# Patient Record
Sex: FEMALE | Race: BLACK OR AFRICAN AMERICAN | NOT HISPANIC OR LATINO | Employment: FULL TIME | URBAN - METROPOLITAN AREA
[De-identification: names, ages, dates, MRNs, and addresses within clinical notes are randomized per-mention and may not be internally consistent; named-entity substitution may affect disease eponyms.]

---

## 2022-06-29 ENCOUNTER — HOSPITAL ENCOUNTER (OUTPATIENT)
Dept: RADIOLOGY | Facility: CLINIC | Age: 67
Discharge: HOME | End: 2022-06-29
Attending: SPECIALIST
Payer: MEDICARE

## 2022-06-29 ENCOUNTER — OFFICE VISIT (OUTPATIENT)
Dept: ORTHOPEDICS | Facility: CLINIC | Age: 67
End: 2022-06-29
Payer: MEDICARE

## 2022-06-29 VITALS
HEIGHT: 61 IN | WEIGHT: 118.38 LBS | BODY MASS INDEX: 22.35 KG/M2 | HEART RATE: 108 BPM | DIASTOLIC BLOOD PRESSURE: 76 MMHG | SYSTOLIC BLOOD PRESSURE: 120 MMHG | TEMPERATURE: 98 F

## 2022-06-29 DIAGNOSIS — M25.561 RIGHT KNEE PAIN, UNSPECIFIED CHRONICITY: Primary | ICD-10-CM

## 2022-06-29 DIAGNOSIS — R26.89 IMPAIRED GAIT AND MOBILITY: ICD-10-CM

## 2022-06-29 DIAGNOSIS — M06.9 RHEUMATOID ARTHRITIS, INVOLVING UNSPECIFIED SITE, UNSPECIFIED WHETHER RHEUMATOID FACTOR PRESENT: ICD-10-CM

## 2022-06-29 DIAGNOSIS — M17.11 PRIMARY OSTEOARTHRITIS OF RIGHT KNEE: ICD-10-CM

## 2022-06-29 DIAGNOSIS — M25.561 RIGHT KNEE PAIN, UNSPECIFIED CHRONICITY: ICD-10-CM

## 2022-06-29 PROCEDURE — 3078F PR MOST RECENT DIASTOLIC BLOOD PRESSURE < 80 MM HG: ICD-10-PCS | Mod: CPTII,,, | Performed by: SPECIALIST

## 2022-06-29 PROCEDURE — 1159F PR MEDICATION LIST DOCUMENTED IN MEDICAL RECORD: ICD-10-PCS | Mod: CPTII,,, | Performed by: SPECIALIST

## 2022-06-29 PROCEDURE — 1160F PR REVIEW ALL MEDS BY PRESCRIBER/CLIN PHARMACIST DOCUMENTED: ICD-10-PCS | Mod: CPTII,,, | Performed by: SPECIALIST

## 2022-06-29 PROCEDURE — 4010F PR ACE/ARB THEARPY RXD/TAKEN: ICD-10-PCS | Mod: CPTII,,, | Performed by: SPECIALIST

## 2022-06-29 PROCEDURE — 73564 XR KNEE COMP 4 OR MORE VIEWS RIGHT: ICD-10-PCS | Mod: RT,,, | Performed by: SPECIALIST

## 2022-06-29 PROCEDURE — 1125F PR PAIN SEVERITY QUANTIFIED, PAIN PRESENT: ICD-10-PCS | Mod: CPTII,,, | Performed by: SPECIALIST

## 2022-06-29 PROCEDURE — 99202 PR OFFICE/OUTPT VISIT, NEW, LEVL II, 15-29 MIN: ICD-10-PCS | Mod: ,,, | Performed by: SPECIALIST

## 2022-06-29 PROCEDURE — 73564 X-RAY EXAM KNEE 4 OR MORE: CPT | Mod: RT,,, | Performed by: SPECIALIST

## 2022-06-29 PROCEDURE — 3008F BODY MASS INDEX DOCD: CPT | Mod: CPTII,,, | Performed by: SPECIALIST

## 2022-06-29 PROCEDURE — 1159F MED LIST DOCD IN RCRD: CPT | Mod: CPTII,,, | Performed by: SPECIALIST

## 2022-06-29 PROCEDURE — 99202 OFFICE O/P NEW SF 15 MIN: CPT | Mod: ,,, | Performed by: SPECIALIST

## 2022-06-29 PROCEDURE — 3078F DIAST BP <80 MM HG: CPT | Mod: CPTII,,, | Performed by: SPECIALIST

## 2022-06-29 PROCEDURE — 1101F PT FALLS ASSESS-DOCD LE1/YR: CPT | Mod: CPTII,,, | Performed by: SPECIALIST

## 2022-06-29 PROCEDURE — 3008F PR BODY MASS INDEX (BMI) DOCUMENTED: ICD-10-PCS | Mod: CPTII,,, | Performed by: SPECIALIST

## 2022-06-29 PROCEDURE — 3074F SYST BP LT 130 MM HG: CPT | Mod: CPTII,,, | Performed by: SPECIALIST

## 2022-06-29 PROCEDURE — 1160F RVW MEDS BY RX/DR IN RCRD: CPT | Mod: CPTII,,, | Performed by: SPECIALIST

## 2022-06-29 PROCEDURE — 1101F PR PT FALLS ASSESS DOC 0-1 FALLS W/OUT INJ PAST YR: ICD-10-PCS | Mod: CPTII,,, | Performed by: SPECIALIST

## 2022-06-29 PROCEDURE — 3288F FALL RISK ASSESSMENT DOCD: CPT | Mod: CPTII,,, | Performed by: SPECIALIST

## 2022-06-29 PROCEDURE — 1125F AMNT PAIN NOTED PAIN PRSNT: CPT | Mod: CPTII,,, | Performed by: SPECIALIST

## 2022-06-29 PROCEDURE — 3288F PR FALLS RISK ASSESSMENT DOCUMENTED: ICD-10-PCS | Mod: CPTII,,, | Performed by: SPECIALIST

## 2022-06-29 PROCEDURE — 3074F PR MOST RECENT SYSTOLIC BLOOD PRESSURE < 130 MM HG: ICD-10-PCS | Mod: CPTII,,, | Performed by: SPECIALIST

## 2022-06-29 PROCEDURE — 4010F ACE/ARB THERAPY RXD/TAKEN: CPT | Mod: CPTII,,, | Performed by: SPECIALIST

## 2022-06-29 RX ORDER — CETIRIZINE HYDROCHLORIDE 10 MG/1
TABLET ORAL
COMMUNITY

## 2022-06-29 RX ORDER — ROSUVASTATIN CALCIUM 5 MG/1
5 TABLET, COATED ORAL DAILY
COMMUNITY
Start: 2022-05-27

## 2022-06-29 RX ORDER — BENAZEPRIL HYDROCHLORIDE 40 MG/1
40 TABLET ORAL DAILY
COMMUNITY
Start: 2022-05-27

## 2022-06-29 RX ORDER — AMLODIPINE BESYLATE 10 MG/1
10 TABLET ORAL DAILY
COMMUNITY
Start: 2022-05-27

## 2022-06-29 RX ORDER — METHOTREXATE 2.5 MG/1
15 TABLET ORAL
Status: ON HOLD | COMMUNITY
Start: 2022-05-13 | End: 2022-08-11 | Stop reason: SDUPTHER

## 2022-06-29 RX ORDER — METHOTREXATE 25 MG/ML
INJECTION, SOLUTION INTRA-ARTERIAL; INTRAMUSCULAR; INTRAVENOUS
Status: ON HOLD | COMMUNITY
Start: 2022-01-28 | End: 2022-08-11 | Stop reason: HOSPADM

## 2022-06-29 RX ORDER — FOLIC ACID 1 MG/1
1000 TABLET ORAL DAILY
COMMUNITY
Start: 2022-05-13

## 2022-06-29 NOTE — PROGRESS NOTES
History of present illness:    66 year old female presents today for evaluation of her right knee pain.  She has a known history of rheumatoid arthritis as well as right knee OA.  She is being followed by rheumatology.  She has ongoing knee pain and swelling and failed conservative treatment such as injections.  She is here to discuss surgical intervention    Past Medical History:   Diagnosis Date    High cholesterol     Hypertension     RA (rheumatoid arthritis)        Past Surgical History:   Procedure Laterality Date    right ovary removal         Current Outpatient Medications   Medication Sig    amLODIPine (NORVASC) 10 MG tablet Take 10 mg by mouth once daily.    benazepriL (LOTENSIN) 40 MG tablet Take 40 mg by mouth once daily.    cetirizine (ZYRTEC) 10 MG tablet Zyrtec 10 mg tablet   Take 1 tablet every day by oral route.    folic acid (FOLVITE) 1 MG tablet Take 1,000 mcg by mouth once daily.    methotrexate 2.5 MG Tab Take 15 mg by mouth every 7 days.    methotrexate 25 mg/mL injection Inject 15 mg into the skin every 7 days.    rosuvastatin (CRESTOR) 5 MG tablet Take 5 mg by mouth once daily.     No current facility-administered medications for this visit.       Review of patient's allergies indicates:   Allergen Reactions    Codeine     Lortab [hydrocodone-acetaminophen]     Opioids - morphine analogues        Family History   Family history unknown: Yes       Social History     Socioeconomic History    Marital status:    Tobacco Use    Smoking status: Never Smoker    Smokeless tobacco: Never Used   Substance and Sexual Activity    Alcohol use: Never    Drug use: Never         Review of Systems:    Constitution:   Denies chills, fever, and sweats.  HENT:   Denies headaches or blurry vision.  Cardiovascular:  Denies chest pain or irregular heart beat.  Respiratory:   Denies cough or shortness of breath.  Gastrointestinal:  Denies abdominal pain, nausea, or  "vomiting.  Musculoskeletal:   Denies muscle cramps.  Neurological:   Denies dizziness or focal weakness.  Psychiatric/Behavior: Normal mental status.  Hematology/Lymph:  Denies bleeding problem or easy bruising/bleeding.  Skin:    Denies rash or suspicious lesions.    Examination:    Vital Signs:    Vitals:    06/29/22 1050 06/29/22 1051   BP: 120/76    Pulse: 108    Temp: 97.6 °F (36.4 °C)    Weight: 53.7 kg (118 lb 6.4 oz)    Height: 5' 1" (1.549 m)    PainSc:   8   8       Body mass index is 22.37 kg/m².    Constitution:   Well-developed, well nourished patient in no acute distress.  Neurological:   Alert and oriented x 3 and cooperative to examination.     Psychiatric/Behavior: Normal mental status.  Respiratory:   No shortness of breath.  Eyes:    Extraoccular muscles intact  Skin:    No scars, rash or suspicious lesions.    Physical Exam:     right Knee:     Grade effusion 1    No erythema or increased heat varus deformity    Patella demonstrated crepitus.    Anteromedial aspect was tender on palpation. Medial aspect was tender on palpation. Medial collateral ligament was tender on palpation.    No lateral joint line tenderness   Active flexion 120 degrees   Active extension 15 degrees   antalgic gait was observed.     X-Ray Knee: A complete knee x-ray with standing for 4 views was performed of the right knee     IMPRESSIONS RADIOLOGY TEST   Narrowing of the joint space bone on bone in medial, lateral and patellofemoral compartments, and osteophytes arising from the knee.    Kellgren Nestor grade 4 changes        Assessment:     Right knee pain, unspecified chronicity  -     X-Ray Knee Complete 4 Or More Views Right; Future; Expected date: 06/29/2022    Primary osteoarthritis of right knee    Rheumatoid arthritis, involving unspecified site, unspecified whether rheumatoid factor present    Impaired gait and mobility        Plan:      Robotic assisted right total knee arthroplasty  The patient is on " methotrexate and Humira so we will hold these medicines prior to surgery  The proposed procedure as well as associated risks/benefits were discussed at length with the patient and family with risks to include pain, bleeding, infection, future surgeries, neurovascular compromise, loss of limb, heart attack, stroke, deep vein thrombosis, and even death. They understand and agree with the treatment plan.      DISCLAIMER: This note may have been dictated using voice recognition software and may contain grammatical errors.

## 2022-07-22 ENCOUNTER — HOSPITAL ENCOUNTER (OUTPATIENT)
Dept: RADIOLOGY | Facility: HOSPITAL | Age: 67
Discharge: HOME | End: 2022-07-22
Attending: PHYSICIAN ASSISTANT
Payer: MEDICARE

## 2022-07-22 ENCOUNTER — OFFICE VISIT (OUTPATIENT)
Dept: ORTHOPEDICS | Facility: CLINIC | Age: 67
End: 2022-07-22
Payer: MEDICARE

## 2022-07-22 VITALS
BODY MASS INDEX: 22.28 KG/M2 | DIASTOLIC BLOOD PRESSURE: 84 MMHG | HEART RATE: 78 BPM | WEIGHT: 118 LBS | HEIGHT: 61 IN | SYSTOLIC BLOOD PRESSURE: 140 MMHG

## 2022-07-22 DIAGNOSIS — M17.11 PRIMARY OSTEOARTHRITIS OF RIGHT KNEE: Primary | ICD-10-CM

## 2022-07-22 DIAGNOSIS — Z01.812 PRE-OPERATIVE LABORATORY EXAMINATION: ICD-10-CM

## 2022-07-22 DIAGNOSIS — M17.11 PRIMARY OSTEOARTHRITIS OF RIGHT KNEE: ICD-10-CM

## 2022-07-22 DIAGNOSIS — E78.00 HYPERCHOLESTEROLEMIA: ICD-10-CM

## 2022-07-22 DIAGNOSIS — M06.9 RHEUMATOID ARTHRITIS, INVOLVING UNSPECIFIED SITE, UNSPECIFIED WHETHER RHEUMATOID FACTOR PRESENT: ICD-10-CM

## 2022-07-22 DIAGNOSIS — R26.89 IMPAIRED GAIT AND MOBILITY: ICD-10-CM

## 2022-07-22 DIAGNOSIS — I10 HYPERTENSION, UNSPECIFIED TYPE: ICD-10-CM

## 2022-07-22 LAB
APPEARANCE UR: CLEAR
BACTERIA #/AREA URNS AUTO: ABNORMAL /HPF
BILIRUB UR QL STRIP.AUTO: NEGATIVE MG/DL
COLOR UR AUTO: ABNORMAL
GLUCOSE UR QL STRIP.AUTO: NEGATIVE MG/DL
KETONES UR QL STRIP.AUTO: NEGATIVE MG/DL
LEUKOCYTE ESTERASE UR QL STRIP.AUTO: NEGATIVE UNIT/L
MRSA PCR SCRN (OHS): NOT DETECTED
NITRITE UR QL STRIP.AUTO: NEGATIVE
PH UR STRIP.AUTO: 6 [PH]
PROT UR QL STRIP.AUTO: NEGATIVE MG/DL
RBC #/AREA URNS AUTO: ABNORMAL /HPF
RBC UR QL AUTO: ABNORMAL UNIT/L
SP GR UR STRIP.AUTO: 1.01
SQUAMOUS #/AREA URNS AUTO: ABNORMAL /HPF
UROBILINOGEN UR STRIP-ACNC: 0.2 MG/DL
WBC #/AREA URNS AUTO: ABNORMAL /HPF

## 2022-07-22 PROCEDURE — 3008F BODY MASS INDEX DOCD: CPT | Mod: CPTII,,, | Performed by: PHYSICIAN ASSISTANT

## 2022-07-22 PROCEDURE — 4010F PR ACE/ARB THEARPY RXD/TAKEN: ICD-10-PCS | Mod: CPTII,,, | Performed by: PHYSICIAN ASSISTANT

## 2022-07-22 PROCEDURE — 3077F PR MOST RECENT SYSTOLIC BLOOD PRESSURE >= 140 MM HG: ICD-10-PCS | Mod: CPTII,,, | Performed by: PHYSICIAN ASSISTANT

## 2022-07-22 PROCEDURE — 99213 PR OFFICE/OUTPT VISIT, EST, LEVL III, 20-29 MIN: ICD-10-PCS | Mod: ,,, | Performed by: PHYSICIAN ASSISTANT

## 2022-07-22 PROCEDURE — 71046 X-RAY EXAM CHEST 2 VIEWS: CPT | Mod: TC

## 2022-07-22 PROCEDURE — 73700 CT LOWER EXTREMITY W/O DYE: CPT | Mod: TC,RT

## 2022-07-22 PROCEDURE — 3008F PR BODY MASS INDEX (BMI) DOCUMENTED: ICD-10-PCS | Mod: CPTII,,, | Performed by: PHYSICIAN ASSISTANT

## 2022-07-22 PROCEDURE — 3079F DIAST BP 80-89 MM HG: CPT | Mod: CPTII,,, | Performed by: PHYSICIAN ASSISTANT

## 2022-07-22 PROCEDURE — 4010F ACE/ARB THERAPY RXD/TAKEN: CPT | Mod: CPTII,,, | Performed by: PHYSICIAN ASSISTANT

## 2022-07-22 PROCEDURE — 3077F SYST BP >= 140 MM HG: CPT | Mod: CPTII,,, | Performed by: PHYSICIAN ASSISTANT

## 2022-07-22 PROCEDURE — 3079F PR MOST RECENT DIASTOLIC BLOOD PRESSURE 80-89 MM HG: ICD-10-PCS | Mod: CPTII,,, | Performed by: PHYSICIAN ASSISTANT

## 2022-07-22 PROCEDURE — 1160F PR REVIEW ALL MEDS BY PRESCRIBER/CLIN PHARMACIST DOCUMENTED: ICD-10-PCS | Mod: CPTII,,, | Performed by: PHYSICIAN ASSISTANT

## 2022-07-22 PROCEDURE — 1159F MED LIST DOCD IN RCRD: CPT | Mod: CPTII,,, | Performed by: PHYSICIAN ASSISTANT

## 2022-07-22 PROCEDURE — 1160F RVW MEDS BY RX/DR IN RCRD: CPT | Mod: CPTII,,, | Performed by: PHYSICIAN ASSISTANT

## 2022-07-22 PROCEDURE — 99213 OFFICE O/P EST LOW 20 MIN: CPT | Mod: ,,, | Performed by: PHYSICIAN ASSISTANT

## 2022-07-22 PROCEDURE — 1159F PR MEDICATION LIST DOCUMENTED IN MEDICAL RECORD: ICD-10-PCS | Mod: CPTII,,, | Performed by: PHYSICIAN ASSISTANT

## 2022-07-22 RX ORDER — SODIUM CHLORIDE 9 MG/ML
INJECTION, SOLUTION INTRAVENOUS CONTINUOUS
Status: CANCELLED | OUTPATIENT
Start: 2022-07-22

## 2022-07-22 RX ORDER — KETOROLAC TROMETHAMINE 30 MG/ML
15 INJECTION, SOLUTION INTRAMUSCULAR; INTRAVENOUS
Status: CANCELLED | OUTPATIENT
Start: 2022-07-22 | End: 2022-07-22

## 2022-07-22 RX ORDER — GABAPENTIN 100 MG/1
600 CAPSULE ORAL
Status: CANCELLED | OUTPATIENT
Start: 2022-07-22

## 2022-07-22 RX ORDER — SCOLOPAMINE TRANSDERMAL SYSTEM 1 MG/1
1 PATCH, EXTENDED RELEASE TRANSDERMAL
Status: CANCELLED | OUTPATIENT
Start: 2022-07-22

## 2022-07-22 RX ORDER — ACETAMINOPHEN 500 MG
1000 TABLET ORAL
Status: CANCELLED | OUTPATIENT
Start: 2022-07-22 | End: 2022-07-22

## 2022-07-22 RX ORDER — TRANEXAMIC ACID 650 MG/1
1950 TABLET ORAL
Status: CANCELLED | OUTPATIENT
Start: 2022-07-22 | End: 2022-07-22

## 2022-07-22 NOTE — H&P (VIEW-ONLY)
History of present illness:    66 year old female presents today for preop evaluation for robotic assisted right total knee arthroplasty on August 9th.  She has a known history of rheumatoid arthritis as well as right knee OA.  She is being followed by rheumatology.  She has ongoing knee pain and swelling and failed conservative treatment such as injections.  No relief with activity modification , home exercise program and medications.  She has decreased range of motion which has decreased activities of daily living.  She is ready to undergo the operation     Past Medical History:   Diagnosis Date    High cholesterol     Hypertension     RA (rheumatoid arthritis)        Past Surgical History:   Procedure Laterality Date    right ovary removal         Current Outpatient Medications   Medication Sig    amLODIPine (NORVASC) 10 MG tablet Take 10 mg by mouth once daily.    benazepriL (LOTENSIN) 40 MG tablet Take 40 mg by mouth once daily.    cetirizine (ZYRTEC) 10 MG tablet Zyrtec 10 mg tablet   Take 1 tablet every day by oral route.    folic acid (FOLVITE) 1 MG tablet Take 1,000 mcg by mouth once daily.    methotrexate 2.5 MG Tab Take 15 mg by mouth every 7 days.    methotrexate 25 mg/mL injection Inject 15 mg into the skin every 7 days.    rosuvastatin (CRESTOR) 5 MG tablet Take 5 mg by mouth once daily.     No current facility-administered medications for this visit.       Review of patient's allergies indicates:   Allergen Reactions    Codeine     Lortab [hydrocodone-acetaminophen]     Opioids - morphine analogues        Family History   Family history unknown: Yes       Social History     Socioeconomic History    Marital status:    Tobacco Use    Smoking status: Never Smoker    Smokeless tobacco: Never Used   Substance and Sexual Activity    Alcohol use: Never    Drug use: Never         Review of Systems:    Constitution:   Denies chills, fever, and sweats.  HENT:   Denies headaches or  "blurry vision.  Cardiovascular:  Denies chest pain or irregular heart beat.  Respiratory:   Denies cough or shortness of breath.  Gastrointestinal:  Denies abdominal pain, nausea, or vomiting.  Musculoskeletal:   Denies muscle cramps.  Neurological:   Denies dizziness or focal weakness.  Psychiatric/Behavior: Normal mental status.  Hematology/Lymph:  Denies bleeding problem or easy bruising/bleeding.  Skin:    Denies rash or suspicious lesions.    Examination:    Vital Signs:    Vitals:    07/22/22 1004   BP: (!) 140/84   Pulse: 78   Weight: 53.5 kg (118 lb)   Height: 5' 1" (1.549 m)       Body mass index is 22.3 kg/m².    Constitution:   Well-developed, well nourished patient in no acute distress.  Neurological:   Alert and oriented x 3 and cooperative to examination.     Psychiatric/Behavior: Normal mental status.  Respiratory:   No shortness of breath.  Eyes:    Extraoccular muscles intact  Skin:    No scars, rash or suspicious lesions.    Physical Exam:     right Knee:     Grade effusion 1    No erythema or increased heat varus deformity    Patella demonstrated crepitus.    Anteromedial aspect was tender on palpation. Medial aspect was tender on palpation. Medial collateral ligament was tender on palpation.    No lateral joint line tenderness   Active flexion 120 degrees   Active extension 15 degrees   antalgic gait was observed.     X-Ray Knee: A complete knee x-ray with standing for 4 views was reviewed of the right knee     IMPRESSIONS RADIOLOGY TEST   Narrowing of the joint space bone on bone in medial, lateral and patellofemoral compartments, and osteophytes arising from the knee.    Kellgren Nestor grade 4 changes        Assessment:     Primary osteoarthritis of right knee    Rheumatoid arthritis, involving unspecified site, unspecified whether rheumatoid factor present    Impaired gait and mobility    Pre-operative laboratory examination        Plan:      Robotic assisted right total knee " arthroplasty  We will use Ecotrin aspirin postoperatively for DVT prophylaxis  The patient is on methotrexate and Humira so we will hold these medicines prior to surgery  The proposed procedure as well as associated risks/benefits were discussed at length with the patient and family with risks to include pain, bleeding, infection, future surgeries, neurovascular compromise, loss of limb, heart attack, stroke, deep vein thrombosis, and even death. They understand and agree with the treatment plan.      DISCLAIMER: This note may have been dictated using voice recognition software and may contain grammatical errors.

## 2022-08-08 ENCOUNTER — TELEPHONE (OUTPATIENT)
Dept: PREADMISSION TESTING | Facility: HOSPITAL | Age: 67
End: 2022-08-08
Payer: MEDICARE

## 2022-08-08 ENCOUNTER — LAB VISIT (OUTPATIENT)
Dept: LAB | Facility: HOSPITAL | Age: 67
End: 2022-08-08
Attending: SPECIALIST
Payer: MEDICARE

## 2022-08-08 ENCOUNTER — ANESTHESIA EVENT (OUTPATIENT)
Dept: SURGERY | Facility: HOSPITAL | Age: 67
End: 2022-08-08
Payer: MEDICARE

## 2022-08-08 DIAGNOSIS — Z01.818 PREOP TESTING: Primary | ICD-10-CM

## 2022-08-08 LAB — SARS-COV-2 RDRP RESP QL NAA+PROBE: NEGATIVE

## 2022-08-08 PROCEDURE — 87635 SARS-COV-2 COVID-19 AMP PRB: CPT

## 2022-08-08 RX ORDER — IBUPROFEN 100 MG/5ML
1000 SUSPENSION, ORAL (FINAL DOSE FORM) ORAL DAILY
COMMUNITY

## 2022-08-09 ENCOUNTER — ANESTHESIA (OUTPATIENT)
Dept: SURGERY | Facility: HOSPITAL | Age: 67
End: 2022-08-09
Payer: MEDICARE

## 2022-08-09 ENCOUNTER — HOSPITAL ENCOUNTER (OUTPATIENT)
Facility: HOSPITAL | Age: 67
Discharge: HOME | End: 2022-08-11
Attending: SPECIALIST | Admitting: SPECIALIST
Payer: MEDICARE

## 2022-08-09 DIAGNOSIS — I10 HYPERTENSION, UNSPECIFIED TYPE: ICD-10-CM

## 2022-08-09 DIAGNOSIS — E78.00 HYPERCHOLESTEROLEMIA: ICD-10-CM

## 2022-08-09 DIAGNOSIS — Z01.812 PRE-OPERATIVE LABORATORY EXAMINATION: ICD-10-CM

## 2022-08-09 DIAGNOSIS — M06.9 RHEUMATOID ARTHRITIS, INVOLVING UNSPECIFIED SITE, UNSPECIFIED WHETHER RHEUMATOID FACTOR PRESENT: ICD-10-CM

## 2022-08-09 DIAGNOSIS — M17.11 PRIMARY OSTEOARTHRITIS OF RIGHT KNEE: ICD-10-CM

## 2022-08-09 DIAGNOSIS — R26.89 IMPAIRED GAIT AND MOBILITY: ICD-10-CM

## 2022-08-09 LAB
HCT VFR BLD AUTO: 32.4 % (ref 37–47)
HGB BLD-MCNC: 10.3 GM/DL (ref 12–16)
POCT GLUCOSE: 100 MG/DL (ref 70–110)

## 2022-08-09 PROCEDURE — 36000713 HC OR TIME LEV V EA ADD 15 MIN: Performed by: SPECIALIST

## 2022-08-09 PROCEDURE — C1713 ANCHOR/SCREW BN/BN,TIS/BN: HCPCS | Performed by: SPECIALIST

## 2022-08-09 PROCEDURE — 96365 THER/PROPH/DIAG IV INF INIT: CPT | Mod: 59

## 2022-08-09 PROCEDURE — A4216 STERILE WATER/SALINE, 10 ML: HCPCS | Performed by: SPECIALIST

## 2022-08-09 PROCEDURE — 37000008 HC ANESTHESIA 1ST 15 MINUTES: Performed by: SPECIALIST

## 2022-08-09 PROCEDURE — 25000003 PHARM REV CODE 250: Performed by: NURSE PRACTITIONER

## 2022-08-09 PROCEDURE — 11000001 HC ACUTE MED/SURG PRIVATE ROOM

## 2022-08-09 PROCEDURE — C1776 JOINT DEVICE (IMPLANTABLE): HCPCS | Performed by: SPECIALIST

## 2022-08-09 PROCEDURE — 30000890 SPECIMEN TO PATHOLOGY: Performed by: SPECIALIST

## 2022-08-09 PROCEDURE — 25000003 PHARM REV CODE 250: Performed by: NURSE ANESTHETIST, CERTIFIED REGISTERED

## 2022-08-09 PROCEDURE — 88311 DECALCIFY TISSUE: CPT | Performed by: SPECIALIST

## 2022-08-09 PROCEDURE — 27201423 OPTIME MED/SURG SUP & DEVICES STERILE SUPPLY: Performed by: SPECIALIST

## 2022-08-09 PROCEDURE — 82962 GLUCOSE BLOOD TEST: CPT | Performed by: SPECIALIST

## 2022-08-09 PROCEDURE — 20985 PR CPTR-ASST SURGICAL NAVIGATION IMAGE-LESS: ICD-10-PCS | Mod: ,,, | Performed by: SPECIALIST

## 2022-08-09 PROCEDURE — 63600175 PHARM REV CODE 636 W HCPCS

## 2022-08-09 PROCEDURE — 36000712 HC OR TIME LEV V 1ST 15 MIN: Performed by: SPECIALIST

## 2022-08-09 PROCEDURE — 30000890 HC MISC. SEND OUT TEST: Performed by: SPECIALIST

## 2022-08-09 PROCEDURE — 71000033 HC RECOVERY, INTIAL HOUR: Performed by: SPECIALIST

## 2022-08-09 PROCEDURE — 99900035 HC TECH TIME PER 15 MIN (STAT)

## 2022-08-09 PROCEDURE — 96376 TX/PRO/DX INJ SAME DRUG ADON: CPT | Mod: 59

## 2022-08-09 PROCEDURE — 94799 UNLISTED PULMONARY SVC/PX: CPT

## 2022-08-09 PROCEDURE — G0378 HOSPITAL OBSERVATION PER HR: HCPCS

## 2022-08-09 PROCEDURE — 63600175 PHARM REV CODE 636 W HCPCS: Performed by: NURSE ANESTHETIST, CERTIFIED REGISTERED

## 2022-08-09 PROCEDURE — 96375 TX/PRO/DX INJ NEW DRUG ADDON: CPT

## 2022-08-09 PROCEDURE — 63600175 PHARM REV CODE 636 W HCPCS: Performed by: SPECIALIST

## 2022-08-09 PROCEDURE — 25000003 PHARM REV CODE 250: Performed by: SPECIALIST

## 2022-08-09 PROCEDURE — 85014 HEMATOCRIT: CPT | Performed by: SPECIALIST

## 2022-08-09 PROCEDURE — 25000003 PHARM REV CODE 250: Performed by: PHYSICIAN ASSISTANT

## 2022-08-09 PROCEDURE — 63600175 PHARM REV CODE 636 W HCPCS: Performed by: PHYSICIAN ASSISTANT

## 2022-08-09 PROCEDURE — 20985 CPTR-ASST DIR MS PX: CPT | Mod: ,,, | Performed by: SPECIALIST

## 2022-08-09 PROCEDURE — 27447 PR TOTAL KNEE ARTHROPLASTY: ICD-10-PCS | Mod: RT,,, | Performed by: SPECIALIST

## 2022-08-09 PROCEDURE — 36415 COLL VENOUS BLD VENIPUNCTURE: CPT | Performed by: SPECIALIST

## 2022-08-09 PROCEDURE — 97162 PT EVAL MOD COMPLEX 30 MIN: CPT

## 2022-08-09 PROCEDURE — 27447 TOTAL KNEE ARTHROPLASTY: CPT | Mod: RT,,, | Performed by: SPECIALIST

## 2022-08-09 PROCEDURE — 27447 TOTAL KNEE ARTHROPLASTY: CPT | Mod: AS,RT,, | Performed by: PHYSICIAN ASSISTANT

## 2022-08-09 PROCEDURE — 88305 TISSUE EXAM BY PATHOLOGIST: CPT | Performed by: SPECIALIST

## 2022-08-09 PROCEDURE — 37000009 HC ANESTHESIA EA ADD 15 MINS: Performed by: SPECIALIST

## 2022-08-09 PROCEDURE — 94761 N-INVAS EAR/PLS OXIMETRY MLT: CPT

## 2022-08-09 PROCEDURE — 25000003 PHARM REV CODE 250

## 2022-08-09 PROCEDURE — 27447 PR TOTAL KNEE ARTHROPLASTY: ICD-10-PCS | Mod: AS,RT,, | Performed by: PHYSICIAN ASSISTANT

## 2022-08-09 DEVICE — ASYMMETRIC PATELLA
Type: IMPLANTABLE DEVICE | Site: KNEE | Status: FUNCTIONAL
Brand: TRIATHLON

## 2022-08-09 DEVICE — PRIMARY TIBIAL BASEPLATE
Type: IMPLANTABLE DEVICE | Site: KNEE | Status: FUNCTIONAL
Brand: TRIATHLON

## 2022-08-09 DEVICE — TIBIAL BEARING INSERT - CS
Type: IMPLANTABLE DEVICE | Site: KNEE | Status: FUNCTIONAL
Brand: TRIATHLON

## 2022-08-09 DEVICE — TOBRA FULL DOSE ANTIBIOTIC BONE CEMENT, 10 PACK CATALOG NUMBER IS 6197-9-010
Type: IMPLANTABLE DEVICE | Site: KNEE | Status: FUNCTIONAL
Brand: SIMPLEX

## 2022-08-09 DEVICE — CRUCIATE RETAINING FEMORAL
Type: IMPLANTABLE DEVICE | Site: KNEE | Status: FUNCTIONAL
Brand: TRIATHLON

## 2022-08-09 RX ORDER — EPINEPHRINE 1 MG/ML
INJECTION, SOLUTION INTRACARDIAC; INTRAMUSCULAR; INTRAVENOUS; SUBCUTANEOUS
Status: DISPENSED
Start: 2022-08-09 | End: 2022-08-09

## 2022-08-09 RX ORDER — ACETAMINOPHEN 10 MG/ML
1000 INJECTION, SOLUTION INTRAVENOUS ONCE
Status: COMPLETED | OUTPATIENT
Start: 2022-08-09 | End: 2022-08-09

## 2022-08-09 RX ORDER — ONDANSETRON 2 MG/ML
INJECTION INTRAMUSCULAR; INTRAVENOUS
Status: DISCONTINUED | OUTPATIENT
Start: 2022-08-09 | End: 2022-08-09

## 2022-08-09 RX ORDER — CEFAZOLIN SODIUM 2 G/100ML
2 INJECTION, SOLUTION INTRAVENOUS
Status: DISCONTINUED | OUTPATIENT
Start: 2022-08-09 | End: 2022-08-09

## 2022-08-09 RX ORDER — POLYETHYLENE GLYCOL 3350 17 G/17G
17 POWDER, FOR SOLUTION ORAL NIGHTLY
Status: DISCONTINUED | OUTPATIENT
Start: 2022-08-09 | End: 2022-08-11 | Stop reason: HOSPADM

## 2022-08-09 RX ORDER — METOCLOPRAMIDE HYDROCHLORIDE 5 MG/ML
10 INJECTION INTRAMUSCULAR; INTRAVENOUS
Status: COMPLETED | OUTPATIENT
Start: 2022-08-09 | End: 2022-08-10

## 2022-08-09 RX ORDER — GLYCOPYRROLATE 0.2 MG/ML
INJECTION INTRAMUSCULAR; INTRAVENOUS
Status: DISCONTINUED | OUTPATIENT
Start: 2022-08-09 | End: 2022-08-09

## 2022-08-09 RX ORDER — LACTULOSE 10 G/15ML
20 SOLUTION ORAL EVERY 6 HOURS PRN
Status: DISCONTINUED | OUTPATIENT
Start: 2022-08-09 | End: 2022-08-11 | Stop reason: HOSPADM

## 2022-08-09 RX ORDER — KETOROLAC TROMETHAMINE 10 MG/1
10 TABLET, FILM COATED ORAL EVERY 6 HOURS
Status: DISCONTINUED | OUTPATIENT
Start: 2022-08-09 | End: 2022-08-11 | Stop reason: HOSPADM

## 2022-08-09 RX ORDER — KETOROLAC TROMETHAMINE 30 MG/ML
INJECTION, SOLUTION INTRAMUSCULAR; INTRAVENOUS
Status: DISPENSED
Start: 2022-08-09 | End: 2022-08-09

## 2022-08-09 RX ORDER — KETOROLAC TROMETHAMINE 30 MG/ML
INJECTION, SOLUTION INTRAMUSCULAR; INTRAVENOUS
Status: DISCONTINUED | OUTPATIENT
Start: 2022-08-09 | End: 2022-08-09 | Stop reason: HOSPADM

## 2022-08-09 RX ORDER — MIDAZOLAM HYDROCHLORIDE 1 MG/ML
INJECTION INTRAMUSCULAR; INTRAVENOUS
Status: DISCONTINUED | OUTPATIENT
Start: 2022-08-09 | End: 2022-08-09

## 2022-08-09 RX ORDER — NAPROXEN SODIUM 220 MG/1
81 TABLET, FILM COATED ORAL 2 TIMES DAILY
Status: DISCONTINUED | OUTPATIENT
Start: 2022-08-10 | End: 2022-08-11 | Stop reason: HOSPADM

## 2022-08-09 RX ORDER — VANCOMYCIN HYDROCHLORIDE 1 G/20ML
INJECTION, POWDER, LYOPHILIZED, FOR SOLUTION INTRAVENOUS
Status: DISPENSED
Start: 2022-08-09 | End: 2022-08-09

## 2022-08-09 RX ORDER — CEFAZOLIN SODIUM 2 G/100ML
2 INJECTION, SOLUTION INTRAVENOUS
Status: COMPLETED | OUTPATIENT
Start: 2022-08-09 | End: 2022-08-10

## 2022-08-09 RX ORDER — SCOLOPAMINE TRANSDERMAL SYSTEM 1 MG/1
1 PATCH, EXTENDED RELEASE TRANSDERMAL
Status: DISCONTINUED | OUTPATIENT
Start: 2022-08-09 | End: 2022-08-09

## 2022-08-09 RX ORDER — ACETAMINOPHEN 500 MG
500 TABLET ORAL
Status: DISCONTINUED | OUTPATIENT
Start: 2022-08-10 | End: 2022-08-11 | Stop reason: HOSPADM

## 2022-08-09 RX ORDER — OXYCODONE HYDROCHLORIDE 5 MG/1
5 TABLET ORAL EVERY 6 HOURS PRN
Status: DISCONTINUED | OUTPATIENT
Start: 2022-08-09 | End: 2022-08-11 | Stop reason: HOSPADM

## 2022-08-09 RX ORDER — MAG HYDROX/ALUMINUM HYD/SIMETH 200-200-20
30 SUSPENSION, ORAL (FINAL DOSE FORM) ORAL EVERY 6 HOURS PRN
Status: DISCONTINUED | OUTPATIENT
Start: 2022-08-09 | End: 2022-08-11 | Stop reason: HOSPADM

## 2022-08-09 RX ORDER — CALCIUM CARBONATE 200(500)MG
500 TABLET,CHEWABLE ORAL 3 TIMES DAILY PRN
Status: DISCONTINUED | OUTPATIENT
Start: 2022-08-09 | End: 2022-08-11 | Stop reason: HOSPADM

## 2022-08-09 RX ORDER — TALC
6 POWDER (GRAM) TOPICAL NIGHTLY PRN
Status: DISCONTINUED | OUTPATIENT
Start: 2022-08-09 | End: 2022-08-11 | Stop reason: HOSPADM

## 2022-08-09 RX ORDER — DOCUSATE SODIUM 100 MG/1
200 CAPSULE, LIQUID FILLED ORAL DAILY
Status: DISCONTINUED | OUTPATIENT
Start: 2022-08-10 | End: 2022-08-11 | Stop reason: HOSPADM

## 2022-08-09 RX ORDER — SODIUM CHLORIDE, SODIUM GLUCONATE, SODIUM ACETATE, POTASSIUM CHLORIDE AND MAGNESIUM CHLORIDE 30; 37; 368; 526; 502 MG/100ML; MG/100ML; MG/100ML; MG/100ML; MG/100ML
1000 INJECTION, SOLUTION INTRAVENOUS CONTINUOUS
Status: DISCONTINUED | OUTPATIENT
Start: 2022-08-09 | End: 2022-08-09

## 2022-08-09 RX ORDER — MORPHINE SULFATE 10 MG/ML
INJECTION INTRAMUSCULAR; INTRAVENOUS; SUBCUTANEOUS
Status: DISCONTINUED | OUTPATIENT
Start: 2022-08-09 | End: 2022-08-09 | Stop reason: HOSPADM

## 2022-08-09 RX ORDER — HYDROCODONE BITARTRATE AND ACETAMINOPHEN 5; 325 MG/1; MG/1
1 TABLET ORAL EVERY 4 HOURS PRN
Status: DISCONTINUED | OUTPATIENT
Start: 2022-08-09 | End: 2022-08-09

## 2022-08-09 RX ORDER — FOLIC ACID 1 MG/1
1000 TABLET ORAL DAILY
Status: DISCONTINUED | OUTPATIENT
Start: 2022-08-09 | End: 2022-08-11 | Stop reason: HOSPADM

## 2022-08-09 RX ORDER — GABAPENTIN 300 MG/1
600 CAPSULE ORAL
Status: COMPLETED | OUTPATIENT
Start: 2022-08-09 | End: 2022-08-09

## 2022-08-09 RX ORDER — ONDANSETRON 2 MG/ML
4 INJECTION INTRAMUSCULAR; INTRAVENOUS DAILY PRN
Status: DISCONTINUED | OUTPATIENT
Start: 2022-08-09 | End: 2022-08-09

## 2022-08-09 RX ORDER — PHENYLEPHRINE HCL IN 0.9% NACL 1 MG/10 ML
SYRINGE (ML) INTRAVENOUS
Status: DISCONTINUED | OUTPATIENT
Start: 2022-08-09 | End: 2022-08-09

## 2022-08-09 RX ORDER — TRANEXAMIC ACID 650 MG/1
1950 TABLET ORAL
Status: COMPLETED | OUTPATIENT
Start: 2022-08-09 | End: 2022-08-09

## 2022-08-09 RX ORDER — TRAMADOL HYDROCHLORIDE 50 MG/1
50 TABLET ORAL EVERY 4 HOURS PRN
Status: DISCONTINUED | OUTPATIENT
Start: 2022-08-09 | End: 2022-08-11 | Stop reason: HOSPADM

## 2022-08-09 RX ORDER — LISINOPRIL 10 MG/1
40 TABLET ORAL DAILY
Status: DISCONTINUED | OUTPATIENT
Start: 2022-08-09 | End: 2022-08-11 | Stop reason: HOSPADM

## 2022-08-09 RX ORDER — SODIUM CHLORIDE 9 MG/ML
INJECTION, SOLUTION INTRAVENOUS
Status: DISPENSED
Start: 2022-08-09 | End: 2022-08-09

## 2022-08-09 RX ORDER — ONDANSETRON 2 MG/ML
4 INJECTION INTRAMUSCULAR; INTRAVENOUS EVERY 6 HOURS PRN
Status: DISCONTINUED | OUTPATIENT
Start: 2022-08-09 | End: 2022-08-11 | Stop reason: HOSPADM

## 2022-08-09 RX ORDER — ROPIVACAINE HYDROCHLORIDE 5 MG/ML
INJECTION, SOLUTION EPIDURAL; INFILTRATION; PERINEURAL
Status: DISPENSED
Start: 2022-08-09 | End: 2022-08-09

## 2022-08-09 RX ORDER — LIDOCAINE HYDROCHLORIDE 10 MG/ML
1 INJECTION, SOLUTION EPIDURAL; INFILTRATION; INTRACAUDAL; PERINEURAL ONCE
Status: DISCONTINUED | OUTPATIENT
Start: 2022-08-09 | End: 2022-08-09

## 2022-08-09 RX ORDER — ACETAMINOPHEN 500 MG
1000 TABLET ORAL
Status: COMPLETED | OUTPATIENT
Start: 2022-08-09 | End: 2022-08-09

## 2022-08-09 RX ORDER — ONDANSETRON 4 MG/1
4 TABLET, ORALLY DISINTEGRATING ORAL ONCE
Status: DISCONTINUED | OUTPATIENT
Start: 2022-08-09 | End: 2022-08-09

## 2022-08-09 RX ORDER — SODIUM CHLORIDE 9 MG/ML
INJECTION, SOLUTION INTRAVENOUS CONTINUOUS
Status: DISCONTINUED | OUTPATIENT
Start: 2022-08-09 | End: 2022-08-11 | Stop reason: HOSPADM

## 2022-08-09 RX ORDER — AMOXICILLIN 250 MG
2 CAPSULE ORAL 2 TIMES DAILY
Status: DISCONTINUED | OUTPATIENT
Start: 2022-08-09 | End: 2022-08-11 | Stop reason: HOSPADM

## 2022-08-09 RX ORDER — DEXAMETHASONE SODIUM PHOSPHATE 4 MG/ML
INJECTION, SOLUTION INTRA-ARTICULAR; INTRALESIONAL; INTRAMUSCULAR; INTRAVENOUS; SOFT TISSUE
Status: DISCONTINUED | OUTPATIENT
Start: 2022-08-09 | End: 2022-08-09

## 2022-08-09 RX ORDER — BISACODYL 10 MG
10 SUPPOSITORY, RECTAL RECTAL DAILY
Status: DISCONTINUED | OUTPATIENT
Start: 2022-08-12 | End: 2022-08-11 | Stop reason: HOSPADM

## 2022-08-09 RX ORDER — KETOROLAC TROMETHAMINE 30 MG/ML
15 INJECTION, SOLUTION INTRAMUSCULAR; INTRAVENOUS
Status: COMPLETED | OUTPATIENT
Start: 2022-08-09 | End: 2022-08-09

## 2022-08-09 RX ORDER — CEFAZOLIN SODIUM 1 G/3ML
INJECTION, POWDER, FOR SOLUTION INTRAMUSCULAR; INTRAVENOUS
Status: DISPENSED
Start: 2022-08-09 | End: 2022-08-09

## 2022-08-09 RX ORDER — EPINEPHRINE 1 MG/ML
INJECTION, SOLUTION INTRACARDIAC; INTRAMUSCULAR; INTRAVENOUS; SUBCUTANEOUS
Status: DISCONTINUED | OUTPATIENT
Start: 2022-08-09 | End: 2022-08-09 | Stop reason: HOSPADM

## 2022-08-09 RX ORDER — BUPIVACAINE HYDROCHLORIDE 7.5 MG/ML
INJECTION, SOLUTION EPIDURAL; RETROBULBAR
Status: COMPLETED | OUTPATIENT
Start: 2022-08-09 | End: 2022-08-09

## 2022-08-09 RX ORDER — MIDAZOLAM HYDROCHLORIDE 1 MG/ML
2 INJECTION INTRAMUSCULAR; INTRAVENOUS ONCE AS NEEDED
Status: DISCONTINUED | OUTPATIENT
Start: 2022-08-09 | End: 2022-08-09

## 2022-08-09 RX ORDER — AMLODIPINE BESYLATE 5 MG/1
10 TABLET ORAL DAILY
Status: DISCONTINUED | OUTPATIENT
Start: 2022-08-09 | End: 2022-08-11 | Stop reason: HOSPADM

## 2022-08-09 RX ORDER — FAMOTIDINE 20 MG/1
20 TABLET, FILM COATED ORAL 2 TIMES DAILY
Status: DISCONTINUED | OUTPATIENT
Start: 2022-08-09 | End: 2022-08-11 | Stop reason: HOSPADM

## 2022-08-09 RX ORDER — SODIUM CHLORIDE 9 MG/ML
INJECTION, SOLUTION INTRAMUSCULAR; INTRAVENOUS; SUBCUTANEOUS
Status: DISCONTINUED | OUTPATIENT
Start: 2022-08-09 | End: 2022-08-09 | Stop reason: HOSPADM

## 2022-08-09 RX ORDER — GABAPENTIN 300 MG/1
300 CAPSULE ORAL NIGHTLY
Status: DISCONTINUED | OUTPATIENT
Start: 2022-08-09 | End: 2022-08-09

## 2022-08-09 RX ORDER — SODIUM CHLORIDE 9 MG/ML
INJECTION, SOLUTION INTRAVENOUS CONTINUOUS
Status: DISCONTINUED | OUTPATIENT
Start: 2022-08-09 | End: 2022-08-09

## 2022-08-09 RX ORDER — ROPIVACAINE HYDROCHLORIDE 5 MG/ML
INJECTION, SOLUTION EPIDURAL; INFILTRATION; PERINEURAL
Status: DISCONTINUED | OUTPATIENT
Start: 2022-08-09 | End: 2022-08-09 | Stop reason: HOSPADM

## 2022-08-09 RX ORDER — DIPHENHYDRAMINE HYDROCHLORIDE 50 MG/ML
25 INJECTION INTRAMUSCULAR; INTRAVENOUS ONCE
Status: DISCONTINUED | OUTPATIENT
Start: 2022-08-09 | End: 2022-08-09

## 2022-08-09 RX ORDER — MEPERIDINE HYDROCHLORIDE 25 MG/ML
12.5 INJECTION INTRAMUSCULAR; INTRAVENOUS; SUBCUTANEOUS ONCE
Status: DISCONTINUED | OUTPATIENT
Start: 2022-08-09 | End: 2022-08-09

## 2022-08-09 RX ORDER — TRAMADOL HYDROCHLORIDE 50 MG/1
50 TABLET ORAL EVERY 4 HOURS PRN
Status: DISCONTINUED | OUTPATIENT
Start: 2022-08-09 | End: 2022-08-09

## 2022-08-09 RX ORDER — MORPHINE SULFATE 10 MG/ML
INJECTION INTRAMUSCULAR; INTRAVENOUS; SUBCUTANEOUS
Status: DISPENSED
Start: 2022-08-09 | End: 2022-08-09

## 2022-08-09 RX ORDER — GENTAMICIN SULFATE 40 MG/ML
INJECTION, SOLUTION INTRAMUSCULAR; INTRAVENOUS
Status: DISPENSED
Start: 2022-08-09 | End: 2022-08-09

## 2022-08-09 RX ORDER — PROPOFOL 10 MG/ML
VIAL (ML) INTRAVENOUS CONTINUOUS PRN
Status: DISCONTINUED | OUTPATIENT
Start: 2022-08-09 | End: 2022-08-09

## 2022-08-09 RX ORDER — VANCOMYCIN HYDROCHLORIDE 1 G/20ML
INJECTION, POWDER, LYOPHILIZED, FOR SOLUTION INTRAVENOUS
Status: DISCONTINUED | OUTPATIENT
Start: 2022-08-09 | End: 2022-08-09 | Stop reason: HOSPADM

## 2022-08-09 RX ORDER — METHOCARBAMOL 750 MG/1
750 TABLET, FILM COATED ORAL EVERY 8 HOURS PRN
Status: DISCONTINUED | OUTPATIENT
Start: 2022-08-09 | End: 2022-08-11 | Stop reason: HOSPADM

## 2022-08-09 RX ADMIN — KETOROLAC TROMETHAMINE 15 MG: 30 INJECTION, SOLUTION INTRAMUSCULAR at 08:08

## 2022-08-09 RX ADMIN — SENNOSIDES AND DOCUSATE SODIUM 2 TABLET: 50; 8.6 TABLET ORAL at 08:08

## 2022-08-09 RX ADMIN — FAMOTIDINE 20 MG: 20 TABLET ORAL at 08:08

## 2022-08-09 RX ADMIN — ACETAMINOPHEN 1000 MG: 500 TABLET ORAL at 08:08

## 2022-08-09 RX ADMIN — BUPIVACAINE HYDROCHLORIDE 1.8 ML: 7.5 INJECTION, SOLUTION EPIDURAL; RETROBULBAR at 10:08

## 2022-08-09 RX ADMIN — Medication 100 MCG: at 10:08

## 2022-08-09 RX ADMIN — TRANEXAMIC ACID 1950 MG: 650 TABLET ORAL at 08:08

## 2022-08-09 RX ADMIN — GABAPENTIN 600 MG: 300 CAPSULE ORAL at 08:08

## 2022-08-09 RX ADMIN — ACETAMINOPHEN 1000 MG: 10 INJECTION INTRAVENOUS at 08:08

## 2022-08-09 RX ADMIN — TRAMADOL HYDROCHLORIDE 50 MG: 50 TABLET, COATED ORAL at 04:08

## 2022-08-09 RX ADMIN — POLYETHYLENE GLYCOL 3350 17 G: 17 POWDER, FOR SOLUTION ORAL at 08:08

## 2022-08-09 RX ADMIN — ONDANSETRON 4 MG: 2 INJECTION INTRAMUSCULAR; INTRAVENOUS at 08:08

## 2022-08-09 RX ADMIN — SODIUM CHLORIDE, SODIUM GLUCONATE, SODIUM ACETATE, POTASSIUM CHLORIDE AND MAGNESIUM CHLORIDE: 526; 502; 368; 37; 30 INJECTION, SOLUTION INTRAVENOUS at 10:08

## 2022-08-09 RX ADMIN — CEFAZOLIN SODIUM 2 G: 2 INJECTION, SOLUTION INTRAVENOUS at 10:08

## 2022-08-09 RX ADMIN — PROPOFOL 50 MCG/KG/MIN: 10 INJECTION, EMULSION INTRAVENOUS at 10:08

## 2022-08-09 RX ADMIN — DEXAMETHASONE SODIUM PHOSPHATE 4 MG: 4 INJECTION, SOLUTION INTRA-ARTICULAR; INTRALESIONAL; INTRAMUSCULAR; INTRAVENOUS; SOFT TISSUE at 10:08

## 2022-08-09 RX ADMIN — MIDAZOLAM 2 MG: 1 INJECTION INTRAMUSCULAR; INTRAVENOUS at 10:08

## 2022-08-09 RX ADMIN — GLYCOPYRROLATE 0.15 MG: 0.2 INJECTION INTRAMUSCULAR; INTRAVENOUS at 10:08

## 2022-08-09 RX ADMIN — Medication 100 MCG: at 11:08

## 2022-08-09 RX ADMIN — SODIUM CHLORIDE: 9 INJECTION, SOLUTION INTRAVENOUS at 07:08

## 2022-08-09 RX ADMIN — ONDANSETRON HYDROCHLORIDE 4 MG: 2 SOLUTION INTRAMUSCULAR; INTRAVENOUS at 10:08

## 2022-08-09 RX ADMIN — CEFAZOLIN SODIUM 2000 MG: 2 INJECTION, SOLUTION INTRAVENOUS at 07:08

## 2022-08-09 RX ADMIN — METOCLOPRAMIDE 10 MG: 5 INJECTION, SOLUTION INTRAMUSCULAR; INTRAVENOUS at 07:08

## 2022-08-09 RX ADMIN — TRAMADOL HYDROCHLORIDE 50 MG: 50 TABLET, COATED ORAL at 08:08

## 2022-08-09 RX ADMIN — ONDANSETRON 4 MG: 2 INJECTION INTRAMUSCULAR; INTRAVENOUS at 04:08

## 2022-08-09 RX ADMIN — KETOROLAC TROMETHAMINE 10 MG: 10 TABLET, FILM COATED ORAL at 07:08

## 2022-08-09 NOTE — PROGRESS NOTES
Patient experiencing a delayed response to anesthesia and pain control issues. Will convert to observation status, Will initiate our pre-emptive multimodal pain mgt protocol, provide post-anesthesia monitoring and perform frequent pain assessments. Will zuluaga for discharge once the patient is tolerating pain and pain medications appropriately and the patient has been cleared from a safety/mobility standpoint.

## 2022-08-09 NOTE — PLAN OF CARE
Hourly rounds performed assessment unchanged  pacu discharge criteria met Pt stable for transfer to phase II

## 2022-08-09 NOTE — PT/OT/SLP EVAL
"Physical Therapy Evaluation    Patient Name:  Tish Martin   MRN:  [id number]    Recommendations:     Discharge Recommendations:  outpatient PT   Discharge Equipment Recommendations: walker, rolling   Barriers to discharge: None    Assessment:     Tish Martin is a 66 y.o. female admitted with a medical diagnosis of Primary osteoarthritis of right knee.      She presents with the following impairments/functional limitations:  weakness, gait instability, impaired endurance, pain, impaired functional mobility, decreased ROM .    Rehab Prognosis: Good; patient would benefit from acute skilled PT services to address these deficits and reach maximum level of function.    Recent Surgery: Procedure(s) (LRB):  ROBOTIC ARTHROPLASTY, KNEE, TOTAL (Right) Day of Surgery    Pt is Oriented x3 and Alert, Cooperative and Motivated      Plan:     During this hospitalization, patient to be seen BID to address the identified rehab impairments via gait training, therapeutic activities, therapeutic exercises and progress toward the following goals:    · Plan of Care Expires:  08/15/22    Subjective     Chief Complaint:   Patient/Family Comments/goals: "get back to walking"  Pain/Comfort:  · Pain Rating 1: 4/10  · Location - Side 1: Right  · Location 1: knee  · Pain Addressed 1: Reposition, Other (see comments) (RN present at end of session to administer medicine)    Patients cultural, spiritual, [employer]alism conflicts given the current situation: no    Living Environment:  Pt lives with alone, has 3 steps with no HR to enter house and has 2nd story but doesn't need to access it.   Prior to admission, patients level of function was independent. Prior responsibilities include: driving, working, household tasks and exercising. Equipment used at home: none.  DME owned (not currently used): bedside commode and rollator.  Upon discharge, patient will have assistance from daughter.      Pt stated they performed HEP/attend prehab prior " to sx: yes    Objective:     Communicated with RN prior to session.  Patient found supine with blood pressure cuff, peripheral IV, pressure relief boots, telemetry  upon PT entry to room.    General Precautions: Standard, fall   Orthopedic Precautions:RLE weight bearing as tolerated   Braces: N/A  Respiratory Status: Room air    Exams:  · Sensation:    · -       Intact  · RLE ROM:        (In degrees) AROM PROM   R knee flexion 91 96   R knee extension 6 4       · RLE Strength: NT due to sx site  · LLE ROM: WNL  · LLE Strength: WNL      Functional Mobility:  · Bed Mobility:     · Supine to Sit: minimum assistance  · Transfers:     · Sit to Stand:  minimum assistance with rolling walker  · Gait: 200ft w/ RW w/ Miguel    Other tasks performed:   N/A      Patient left up in chair with call button in reach.    GOALS:   Multidisciplinary Problems     Physical Therapy Goals        Problem: Physical Therapy    Goal Priority Disciplines Outcome Goal Variances Interventions   Physical Therapy Goal     PT, PT/OT Ongoing, Progressing     Description: Pt will improve functional independence by performing:    Bed mobility: mod I  Sit to stand: mod I  Car Transfer: SBA with rolling walker  Ambulation x 200'  feet with mod I and rolling walker  1 Step (Curb): SBA and rolling walker  3 Steps: Min A  and RW  right knee AROM flexion (in degrees): 100  right knee AROM extension (in degrees): 0   Independent with total knee HEP                       History:     Past Medical History:   Diagnosis Date    High cholesterol     Hypertension     RA (rheumatoid arthritis)        Past Surgical History:   Procedure Laterality Date    right ovary removal         Time Tracking:     PT Received On:    PT Start Time: 1605     PT Stop Time: 1629  PT Total Time (min): 24 min     Billable Minutes: Evaluation 24 08/09/2022

## 2022-08-09 NOTE — ANESTHESIA PREPROCEDURE EVALUATION
08/09/2022  Tish Martin is a 66 y.o., female.  ROBOTIC ARTHROPLASTY, KNEE, TOTAL (Right Knee)      Pre-op Assessment    I have reviewed the Patient Summary Reports.     I have reviewed the Nursing Notes. I have reviewed the NPO Status.   I have reviewed the Medications.     Review of Systems  Anesthesia Hx:  No problems with previous Anesthesia    Hematology/Oncology:  Hematology Normal   Oncology Normal     EENT/Dental:EENT/Dental Normal   Cardiovascular:   Exercise tolerance: good Hypertension  Denies Angina.  Denies Orthopnea.  Denies MILLER.  Functional Capacity good / => 4 METS    Pulmonary:  Pulmonary Normal    Renal/:   Denies Chronic Renal Disease.     Hepatic/GI:  Hepatic/GI Normal    Musculoskeletal:  Musculoskeletal Normal    Neurological:  Neurology Normal    Endocrine:  Endocrine Normal  Denies Morbid Obesity / BMI > 40  Dermatological:  Skin Normal    Psych:  Psychiatric Normal        Status: Final result     Visible to patient: No (inaccessible in Patient Portal)     Next appt: None     Dx: Hypercholesterolemia; Pre-operative l...     0 Result Notes           Narrative  Performed by: GEMUSE  Test Reason : M17.11,M06.9,R26.89,Z01.812,I10,E78.00,     Vent. Rate : 070 BPM     Atrial Rate : 070 BPM      P-R Int : 134 ms          QRS Dur : 086 ms       QT Int : 402 ms       P-R-T Axes : 054 047 045 degrees      QTc Int : 434 ms     Normal sinus rhythm   Normal ECG   No previous ECGs available   Confirmed by Jefferson PATTERSON, Gabriel ([id number]) on 7/22/2022 4:34:00 PM     Referred By: SULEIMAN BATES           Confirmed By:Gabriel Paulino MD      Specimen Collected: 07/22/22 11:40 Last Resulted: 07/22/22 16:34        Order Details      View Encounter      Lab and Collection Details      Routing      Result History             Result Care Coordination        Patient Communication     Add Comments   Not seen Back to Top              All Reviewers List    ROSCOE Kimble on 7/25/2022 07:28      Latest Reference Range & Units 07/22/22 11:29   WBC 4.5 - 11.5 x10(3)/mcL 6.4   RBC 4.20 - 5.40 x10(6)/mcL 4.55   Hemoglobin 12.0 - 16.0 gm/dL 11.5 (L)   Hematocrit 37.0 - 47.0 % 35.8 (L)   MCV 80.0 - 94.0 fL 78.7 (L)   MCH 27.0 - 31.0 pg 25.3 (L)   MCHC 33.0 - 36.0 mg/dL 32.1 (L)   RDW 11.5 - 17.0 % 17.6 (H)   Platelets 130 - 400 x10(3)/mcL 335   MPV 7.4 - 10.4 fL 9.2   Neut % % 60.5   LYMPH % % 32.3   Mono % % 4.8   Eosinophil % % 1.7   Basophil % % 0.5   Immature Granulocytes % 0.2   Neut # 2.1 - 9.2 x10(3)/mcL 3.9   Lymph # 0.6 - 4.6 x10(3)/mcL 2.07   Mono # 0.1 - 1.3 x10(3)/mcL 0.31   Eos # 0 - 0.9 x10(3)/mcL 0.11   Baso # 0 - 0.2 x10(3)/mcL 0.03   Immature Grans (Abs) 0 - 0.04 x10(3)/mcL 0.01   nRBC % 0.0   (L): Data is abnormally low  (H): Data is abnormally high       Latest Reference Range & Units 07/22/22 11:29   Sodium 136 - 145 mmol/L 142   Potassium 3.5 - 5.1 mmol/L 3.8   Chloride 98 - 107 mmol/L 102   CO2 23 - 31 mmol/L 29   BUN 9.8 - 20.1 mg/dL 12.4   Creatinine 0.55 - 1.02 mg/dL 0.65   eGFR if non African American mls/min/1.73/m2 >60   Glucose 82 - 115 mg/dL 95   Calcium 8.4 - 10.2 mg/dL 9.6   Alkaline Phosphatase 40 - 150 unit/L 83   PROTEIN TOTAL 5.8 - 7.6 gm/dL 8.3 (H)   Albumin 3.4 - 4.8 gm/dL 4.0   Albumin/Globulin Ratio 1.1 - 2.0 ratio 0.9 (L)   BILIRUBIN TOTAL <=1.5 mg/dL 0.3   AST 5 - 34 unit/L 21   ALT 0 - 55 unit/L 22   Globulin, Total 2.4 - 3.5 gm/dL 4.3 (H)   (H): Data is abnormally high  (L): Data is abnormally low    Anesthesia Plan  Type of Anesthesia, risks & benefits discussed:    Anesthesia Type: Spinal  Intra-op Monitoring Plan: Standard ASA Monitors  Post Op Pain Control Plan: multimodal analgesia  Induction:  IV  Airway Plan: Direct  Informed Consent: Informed consent signed with the Patient and all parties understand the risks and agree with anesthesia plan.  All questions answered. Patient consented to  blood products? Yes  ASA Score: 2  Day of Surgery Review of History & Physical: H&P Update referred to the surgeon/provider.    Ready For Surgery From Anesthesia Perspective.     .

## 2022-08-09 NOTE — ANESTHESIA POSTPROCEDURE EVALUATION
Anesthesia Post Evaluation    Patient: Tish Martin    Procedure(s) Performed: Procedure(s) (LRB):  ROBOTIC ARTHROPLASTY, KNEE, TOTAL (Right)    Final Anesthesia Type: spinal      Patient location during evaluation: floor  Patient participation: Yes- Able to Participate  Level of consciousness: awake and alert and oriented  Post-procedure vital signs: reviewed and stable  Pain management: adequate  Airway patency: patent    PONV status at discharge: No PONV, nausea (controlled) and vomiting (controlled)  Anesthetic complications: no      Cardiovascular status: blood pressure returned to baseline and stable  Respiratory status: unassisted    Comments: SENSORI-MOTOR INTACT          Vitals Value Taken Time   /57 08/09/22 1349   Temp 36.6 °C (97.9 °F) 08/09/22 1234   Pulse 74 08/09/22 1350   Resp 13 08/09/22 1350   SpO2 100 % 08/09/22 1350   Vitals shown include unvalidated device data.      No case tracking events are documented in the log.      Pain/Leo Score: Pain Rating Prior to Med Admin: 0 (8/9/2022  8:52 AM)  Leo Score: 6 (8/9/2022 12:34 PM)

## 2022-08-09 NOTE — PLAN OF CARE
Problem: Physical Therapy  Goal: Physical Therapy Goal  Description: Pt will improve functional independence by performing:    Bed mobility: mod I  Sit to stand: mod I  Car Transfer: SBA with rolling walker  Ambulation x 200'  feet with mod I and rolling walker  1 Step (Curb): SBA and rolling walker  3 Steps: Min A  and RW  right knee AROM flexion (in degrees): 100  right knee AROM extension (in degrees): 0   Independent with total knee HEP      Outcome: Ongoing, Progressing

## 2022-08-09 NOTE — OP NOTE
DATE OF PROCEDURE: 08/09/2022      PREOPERATIVE DIAGNOSIS:  Arthritis, right knee.     POSTOPERATIVE DIAGNOSIS:  Arthritis, right knee.     PROCEDURES PERFORMED:  Robotically-assisted right total knee arthroplasty.     SURGEON:  Harley Carroll M.D.     ASSISTANT: First assistant:Jae Arevalo, certified physician assistant, who was necessary and essential for all aspects of the operation, including but no limited to patient positioning, surgical exposure, bony preparation, implantation, wound closure, and dressing placement.       ANESTHESIA: spinal     COMPLICATIONS:  None.     COUNTS:  Correct.     DISPOSITION:  Recovery Room, stable.     SPECIMENS:  Bone and cartilage.     FINDINGS:  Arthritis.      ESTIMATED BLOOD LOSS:  <25ml     IMPLANTS:  Melo Triathlon size 2 right  Triathlon cruciate retaining femoral component and a size 2 primary tibial baseplate, 32 mm patella and a size 2, 9 mm   CS tibial insert.     INDICATIONS FOR PROCEDURE:    Tish Martin is a 66 y.o. year old female    who is   having symptoms of right knee pain.  Physical examination and imaging studies   were consistent with arthritis.Treatment options were explained and it was decided   to proceed with robotically-assisted right total knee arthroplasty.  She was   aware of reasonable treatment options as well as risks and benefits.       PROCEDURE IN DETAIL:  After appropriate consent was obtained, the patient was  brought to the Operating Room, anesthesia was administered.  She received   antibiotic prophylaxis.  A tourniquet was applied to the proximal  right thigh.  right lower extremity was then prepped and draped in usual sterile   fashion.  The leg beverly was applied.  Timeout was called.  Limb was elevated   and tourniquet was inflated.  The knee was flexed.  An incision was made from   the tibial tubercle just proximal to the superior pole of the patella.  It was   taken down through the skin and retinaculum.  Skin bleeders  were coagulated with cautery. A medial parapatellar arthrotomy   was performed.  An 11 mm resection of the undersurface of the patella was performed. Following this, the anterior cruciate ligament was resected, fat pad   was excised, and medial and lateral meniscal remnants were excised.  Pins were placed in the femur and tibia, followed by assembly and registration of the femoral and tibial arrays.  Hip center and ankle center registration was performed. Femoral and tibial checkpoints were placed and registered. Fine point registration of the femur and tibia was performed, followed by excision of osteophytes and ligament balancing.  When the plan was balanced symmetrically throughout a range of motion, robotic assisted size 2 femoral and size 2 tibial cuts were made.  Posterior oseophytes and loose bodies were excised. A size 2 tibial trial was placed and pinned posteromedially to allow for rotational adjustment and appropriate patella tracking prior to final positional pinning. I then placed a 9 mm trial tibial bearing insert along with a size 2 femoral trial.  The knee was brought into full extension and the preoperative varus deformity was corrected appropriately, relative to the mechanical axis.  The patella was sized for a 32 mm component, drilled, and a trial was placed. Intra-operative motion was 0 degrees to 140 degrees, with excellent medial and lateral stability in mid and deep flexion as well as extension. The patella tracked appropriately and the final tibial rotation was pinned in position.  There was symmetric ligament balancing throughout the range of motion.  The femur and tibial bone preparation was then made for implantation.  Trials were removed and bone surfaces were irrigated, suctioned, and prepared.  I then implanted a size 2 tibial component, followed by pressfit of a 9 mm polyethylene bearing. The size 2 femur was then implanted along with a 32 mm asymmetric patella component. There was  excellent fixation of all components. Range of motion was 0 degrees to 140 degrees with symmetric ligament balancing and appropriate patella tracking. The knee was irrigated, suctioned, and injected for postoperative pain control. The arthrotomy was then closed with #1 braided suture, followed by reapproximation of skin edges with 2-0 vicryl suture. Surgical staples were used for skin closure. Sterile dressings were applied. The patient was then taken to recovery room in stable condition.

## 2022-08-09 NOTE — TRANSFER OF CARE
"Anesthesia Transfer of Care Note    Patient: Tish Martin    Procedure(s) Performed: Procedure(s) (LRB):  ROBOTIC ARTHROPLASTY, KNEE, TOTAL (Right)    Patient location: PACU    Anesthesia Type: spinal    Transport from OR: Transported from OR on room air with adequate spontaneous ventilation    Post pain: adequate analgesia    Post assessment: no apparent anesthetic complications    Post vital signs: stable    Level of consciousness: awake and alert    Complications: none    Transfer of care protocol was followed      Last vitals:   Visit Vitals  /68   Pulse 71   Temp (!) 35.3 °C (95.5 °F) (Tympanic)   Resp 18   Ht 5' 1" (1.549 m)   Wt 55.7 kg (122 lb 12.7 oz)   SpO2 100%   Breastfeeding No   BMI 23.20 kg/m²     "

## 2022-08-09 NOTE — ANESTHESIA PROCEDURE NOTES
Spinal    Diagnosis: DJD  Patient location during procedure: OR  Start time: 8/9/2022 10:41 AM  Timeout: 8/9/2022 10:40 AM  End time: 8/9/2022 10:43 AM    Staffing  Authorizing Provider: Chu Garzon CRNA  Performing Provider: Ilia Rajput MD    Spinal Block  Location: L4-5  Injection technique: single shot  Needle  Needle gauge: 25 G  Needle length: 3.5 in  Needle localization: anatomical landmarks  Assessment  Sensory level: T4   Dermatomal levels determined by pinch or prick  Ease of block: easy  Additional Notes  TOLERATED WELL  Medications:    Medications: bupivacaine (pf) (MARCAINE) injection 0.75% - Intraspinal   1.8 mL - 8/9/2022 10:42:00 AM

## 2022-08-10 ENCOUNTER — TELEPHONE (OUTPATIENT)
Dept: ORTHOPEDICS | Facility: CLINIC | Age: 67
End: 2022-08-10
Payer: MEDICARE

## 2022-08-10 LAB
ANION GAP SERPL CALC-SCNC: 7 MEQ/L
BUN SERPL-MCNC: 14.3 MG/DL (ref 9.8–20.1)
CALCIUM SERPL-MCNC: 8.4 MG/DL (ref 8.4–10.2)
CHLORIDE SERPL-SCNC: 109 MMOL/L (ref 98–107)
CO2 SERPL-SCNC: 27 MMOL/L (ref 23–31)
CREAT SERPL-MCNC: 0.63 MG/DL (ref 0.55–1.02)
CREAT/UREA NIT SERPL: 23
ERYTHROCYTE [DISTWIDTH] IN BLOOD BY AUTOMATED COUNT: 17.2 % (ref 11.5–17)
GFR SERPLBLD CREATININE-BSD FMLA CKD-EPI: >60 MLS/MIN/1.73/M2
GLUCOSE SERPL-MCNC: 121 MG/DL (ref 82–115)
HCT VFR BLD AUTO: 28.1 % (ref 37–47)
HGB BLD-MCNC: 9 GM/DL (ref 12–16)
MCH RBC QN AUTO: 25.8 PG (ref 27–31)
MCHC RBC AUTO-ENTMCNC: 32 MG/DL (ref 33–36)
MCV RBC AUTO: 80.5 FL (ref 80–94)
NRBC BLD AUTO-RTO: 0 %
PLATELET # BLD AUTO: 247 X10(3)/MCL (ref 130–400)
PMV BLD AUTO: 9.7 FL (ref 7.4–10.4)
POTASSIUM SERPL-SCNC: 4 MMOL/L (ref 3.5–5.1)
RBC # BLD AUTO: 3.49 X10(6)/MCL (ref 4.2–5.4)
SODIUM SERPL-SCNC: 143 MMOL/L (ref 136–145)
WBC # SPEC AUTO: 9.3 X10(3)/MCL (ref 4.5–11.5)

## 2022-08-10 PROCEDURE — 97116 GAIT TRAINING THERAPY: CPT

## 2022-08-10 PROCEDURE — 97110 THERAPEUTIC EXERCISES: CPT

## 2022-08-10 PROCEDURE — 94799 UNLISTED PULMONARY SVC/PX: CPT

## 2022-08-10 PROCEDURE — 85027 COMPLETE CBC AUTOMATED: CPT | Performed by: SPECIALIST

## 2022-08-10 PROCEDURE — 63600175 PHARM REV CODE 636 W HCPCS: Performed by: SPECIALIST

## 2022-08-10 PROCEDURE — 96366 THER/PROPH/DIAG IV INF ADDON: CPT

## 2022-08-10 PROCEDURE — 36415 COLL VENOUS BLD VENIPUNCTURE: CPT | Performed by: SPECIALIST

## 2022-08-10 PROCEDURE — G0378 HOSPITAL OBSERVATION PER HR: HCPCS

## 2022-08-10 PROCEDURE — 25000003 PHARM REV CODE 250: Performed by: NURSE PRACTITIONER

## 2022-08-10 PROCEDURE — 96376 TX/PRO/DX INJ SAME DRUG ADON: CPT

## 2022-08-10 PROCEDURE — 94761 N-INVAS EAR/PLS OXIMETRY MLT: CPT

## 2022-08-10 PROCEDURE — 80048 BASIC METABOLIC PNL TOTAL CA: CPT | Performed by: SPECIALIST

## 2022-08-10 PROCEDURE — 25000003 PHARM REV CODE 250: Performed by: SPECIALIST

## 2022-08-10 RX ADMIN — ACETAMINOPHEN 500 MG: 500 TABLET ORAL at 12:08

## 2022-08-10 RX ADMIN — ASPIRIN 81 MG CHEWABLE TABLET 81 MG: 81 TABLET CHEWABLE at 08:08

## 2022-08-10 RX ADMIN — ONDANSETRON 4 MG: 2 INJECTION INTRAMUSCULAR; INTRAVENOUS at 09:08

## 2022-08-10 RX ADMIN — METOCLOPRAMIDE 10 MG: 5 INJECTION, SOLUTION INTRAMUSCULAR; INTRAVENOUS at 05:08

## 2022-08-10 RX ADMIN — KETOROLAC TROMETHAMINE 10 MG: 10 TABLET, FILM COATED ORAL at 05:08

## 2022-08-10 RX ADMIN — CEFAZOLIN SODIUM 2000 MG: 2 INJECTION, SOLUTION INTRAVENOUS at 12:08

## 2022-08-10 RX ADMIN — ACETAMINOPHEN 500 MG: 500 TABLET ORAL at 05:08

## 2022-08-10 RX ADMIN — METOCLOPRAMIDE 10 MG: 5 INJECTION, SOLUTION INTRAMUSCULAR; INTRAVENOUS at 12:08

## 2022-08-10 RX ADMIN — CEFAZOLIN SODIUM 2000 MG: 2 INJECTION, SOLUTION INTRAVENOUS at 05:08

## 2022-08-10 RX ADMIN — KETOROLAC TROMETHAMINE 10 MG: 10 TABLET, FILM COATED ORAL at 06:08

## 2022-08-10 RX ADMIN — ASPIRIN 81 MG CHEWABLE TABLET 81 MG: 81 TABLET CHEWABLE at 09:08

## 2022-08-10 RX ADMIN — TRAMADOL HYDROCHLORIDE 50 MG: 50 TABLET, COATED ORAL at 09:08

## 2022-08-10 RX ADMIN — DOCUSATE SODIUM 200 MG: 100 CAPSULE, LIQUID FILLED ORAL at 05:08

## 2022-08-10 RX ADMIN — ACETAMINOPHEN 500 MG: 500 TABLET ORAL at 08:08

## 2022-08-10 RX ADMIN — FAMOTIDINE 20 MG: 20 TABLET ORAL at 08:08

## 2022-08-10 RX ADMIN — LISINOPRIL 40 MG: 10 TABLET ORAL at 08:08

## 2022-08-10 RX ADMIN — KETOROLAC TROMETHAMINE 10 MG: 10 TABLET, FILM COATED ORAL at 12:08

## 2022-08-10 RX ADMIN — AMLODIPINE BESYLATE 10 MG: 5 TABLET ORAL at 08:08

## 2022-08-10 RX ADMIN — ACETAMINOPHEN 500 MG: 500 TABLET ORAL at 04:08

## 2022-08-10 RX ADMIN — ACETAMINOPHEN 500 MG: 500 TABLET ORAL at 09:08

## 2022-08-10 RX ADMIN — SENNOSIDES AND DOCUSATE SODIUM 2 TABLET: 50; 8.6 TABLET ORAL at 09:08

## 2022-08-10 RX ADMIN — FOLIC ACID 1000 MCG: 1 TABLET ORAL at 08:08

## 2022-08-10 RX ADMIN — FAMOTIDINE 20 MG: 20 TABLET ORAL at 09:08

## 2022-08-10 RX ADMIN — POLYETHYLENE GLYCOL 3350 17 G: 17 POWDER, FOR SOLUTION ORAL at 09:08

## 2022-08-10 NOTE — PLAN OF CARE
S/p TKR. Spk w pt -- pt lives alone; but daughter, Mei to asst w homecare. Pt needs RW. Provider list given. Foc obtained.   Called/ faxed referral for dme to [facility]. Daughter will pickup.   Called/ faxed referral for outpatient therapy to [facility] They will contact pt with appointment date & time.   PCP: Flora Hernandez  Rx: [facility]    Contact# Mei [phone].

## 2022-08-10 NOTE — TELEPHONE ENCOUNTER
Margarita from utilization called stating that patient was denied inpatient by her insurance and that it will need it to be observation order.

## 2022-08-10 NOTE — PLAN OF CARE
Problem: Physical Therapy  Goal: Physical Therapy Goal  Description: Pt will improve functional independence by performing:    MET 08/10/2022 Bed mobility: mod I  Sit to stand: mod I  MET 08/10/2022 Car Transfer: SBA with rolling walker  Ambulation x 200'  feet with mod I and rolling walker  1 Step (Curb): SBA and rolling walker  MET 08/10/2022 3 Steps: Min A  and RW  right knee AROM flexion (in degrees): 100  right knee AROM extension (in degrees): 0   Independent with total knee HEP      Outcome: Ongoing, Progressing

## 2022-08-10 NOTE — PROGRESS NOTES
"  The patient is status post right postoperative day number 1  Resting in bed, pain controlled  Ambulated well with therapy yesterday  No issues reported by nursing overnight     Vital Signs  Temp: 97.9 °F (36.6 °C)  Temp src: Oral  Pulse: 65  Heart Rate Source: Monitor  Resp: 20  SpO2: 97 %  Pulse Oximetry Type: Intermittent  Oxygen Concentration (%): 80  O2 Device (Oxygen Therapy): room air  BP: 112/67  Arousal Level: arouses to voice  Height and Weight  Height: 5' 1" (154.9 cm)  Height Method: Stated  Weight: 55.7 kg (122 lb 12.7 oz)  BSA (Calculated - sq m): 1.55 sq meters  BMI (Calculated): 23.2  Weight in (lb) to have BMI = 25: 132]    +FHL/EHL  BCR distally  Dressing c/d/I  Thigh and calf compartments soft and compressible  SILT distally    Recent Lab Results       08/10/22  0512   08/10/22  0511   08/09/22  1311   08/09/22  0851        Anion Gap   7.0           BUN   14.3           BUN/CREAT RATIO   23           Calcium   8.4           Chloride   109           CO2   27           Creatinine   0.63           eGFR   >60           Glucose   121           Hematocrit 28.1     32.4         Hemoglobin 9.0     10.3         MCH 25.8             MCHC 32.0             MCV 80.5             MPV 9.7             nRBC 0.0             Platelets 247             POCT Glucose       100       Potassium   4.0           RBC 3.49             RDW 17.2             Sodium   143           WBC 9.3                   A/P:  Status post right total knee arthroplasty  Orthopedically stable  Continue with postoperative care, PT/OT  Aspirin for DVT PPx  Discharge home today with outpatient physical therapy  Follow-up in 2 weeks in office  "

## 2022-08-10 NOTE — PT/OT/SLP PROGRESS
"Physical Therapy Treatment    Patient Name:  Tish Martin   MRN:  [id number]    Recommendations:     Discharge Recommendations:  outpatient PT   Discharge Equipment Recommendations: walker, rolling   Barriers to discharge: None    Assessment:     Tish Martin is a 66 y.o. female admitted with a medical diagnosis of Primary osteoarthritis of right knee.  She presents with the following impairments/functional limitations:  weakness, gait instability, impaired endurance, decreased safety awareness, pain, impaired functional mobility, decreased ROM .    Rehab Prognosis: Good; patient would benefit from acute skilled PT services to address these deficits and reach maximum level of function.    Recent Surgery: Procedure(s) (LRB):  ROBOTIC ARTHROPLASTY, KNEE, TOTAL (Right) 1 Day Post-Op    Plan:     During this hospitalization, patient to be seen BID to address the identified rehab impairments via gait training, therapeutic activities, therapeutic exercises and progress toward the following goals:    · Plan of Care Expires:  08/15/22    Subjective     Chief Complaint:   Patient/Family Comments/goals:   Pain/Comfort:  · Pain Rating 1: 4/10  · Location - Side 1: Right  · Location 1: knee  · Pain Addressed 1: Pre-medicate for activity, Other (see comments) (cold pack)  · Pain Rating Post-Intervention 1: 4/10      Objective:     Communicated with RN prior to session.  Patient found supine with blood pressure cuff, peripheral IV, pressure relief boots, telemetry upon PT entry to room.     General Precautions: Standard, fall   Orthopedic Precautions:RLE weight bearing as tolerated   Braces: N/A  Respiratory Status: Room air     Functional Mobility:  · Bed Mobility:     · Scooting: modified independence  · Supine to Sit: modified independence  · Transfers:     · Sit to Stand:  stand by assistance with rolling walker  · Gait: 200ft w/ RW and SBA  · Stairs:  Pt ascended/descended 3 stair(s) and 4" curb step with Rolling " Walker with right handrail with Minimal Assistance.    · Minor loss of balance w/ 2nd trial curb negotiation, mod A for safety and recovery of LOB                Therapeutic Activities and Exercises:    Pt performed 1x10 of total knee HEP, handout provided    (In degrees) AROM PROM   R knee flexion 96    R knee extension 1 0         Patient left up in chair with all lines intact, call button in reach and RN notified..    GOALS:   Multidisciplinary Problems     Physical Therapy Goals        Problem: Physical Therapy    Goal Priority Disciplines Outcome Goal Variances Interventions   Physical Therapy Goal     PT, PT/OT Ongoing, Progressing     Description: Pt will improve functional independence by performing:    MET 08/10/2022 Bed mobility: mod I  Sit to stand: mod I  MET 08/10/2022 Car Transfer: SBA with rolling walker  Ambulation x 200'  feet with mod I and rolling walker  1 Step (Curb): SBA and rolling walker  MET 08/10/2022 3 Steps: Min A  and RW  right knee AROM flexion (in degrees): 100  right knee AROM extension (in degrees): 0   Independent with total knee HEP                       Time Tracking:     PT Received On:    PT Start Time: 1102     PT Stop Time: 1130  PT Total Time (min): 28 min     Billable Minutes: Gait Training 14 and Therapeutic Exercise 14    Treatment Type: Treatment  PT/PTA: PT           08/10/2022

## 2022-08-10 NOTE — DISCHARGE INSTRUCTIONS
[facility]  [facility] [facility] [address]  [place] [address]  Phone [phone]       /      Fax [phone]  SURGEON: Dr. Carroll    After discharge, all questions or concerns should be handled at your surgeon's office ([phone]). If it is a weekend or after hours, you will get the surgeon on call.     Discharge Medications:    PAIN MANAGEMENT: Next Dose Available           Tylenol/Acetaminophen 500mg- every 4 hours, around the clock (WHILE AWAKE) 4:00pm     Robaxin/Methocarbamol 500mg (Muscle Relaxer) - Every 6-8 hours AS NEEDED for muscle spasms, thigh pain or additional pain control Start anytime       Ultram/Tramadol 50mg (Pain Med) - every 4-6 hours AS NEEDED for pain 12:30pm                   COMPLICATION PREVENTION MEDS: Next Dose DUE   Aspirin 81mg twice a day for 6 weeks post-op for blood clot prevention PM on 8/11/2022   Senokot S 8.6/50mg - 2 tablets once or twice a day while on pain meds and muscle relaxers for constipation prevention PM on 8/11/2022                 Total Knee Replacement      PAIN MEDICATIONS/PAIN MANAGEMENT:  Tylenol/Acetaminophen 500mg every 4 hours, around the clock (WHILE AWAKE). Take Ultram (Tramadol) 50mg (pain pill) every 4-6 hours as needed for pain.    **NO MORE THAN 3000mg OF TYLENOL IN 24 HOURS**.     Robaxin/Methocarbamol 500mg (muscle relaxer)- you can take every 6-8 hours as needed for muscle spasms, thigh pain and stiffness, additional pain control or breakthrough pain medications. This medication is helpful for pain control while lessening your need for narcotics. Please reduce the use gradually as the pain and spasms lessen. DO NOT TAKE AT THE SAME TIME AS A PAIN PILL. YOU WILL BE BETTER SERVED WITH 2 HOURS BETWEEN PAIN PILL AND MUSCLE RELAXER.     Use the medication log in your discharge packet to keep track of your medications.      **Other things that help with pain control is WALKING, COMPRESSION WRAP, ICE and ELEVATION!!**      BLOOD  CLOT PREVENTION:   Aspirin 81 mg twice a day for 6 weeks postop. Start on 8/11/22. Stop on 9/23/22.  If you were taking a baby aspirin prior to surgery, okay to restart after 6 weeks - 9/24/22.  You need to continuing wearing your compression stocking (STANFORD Hose - ThromboEmbolic Disease Prevention Device) for the next 2-6 weeks post-op. It is ok to remove them for hygiene and at bedtime.   Hand wash and Dry. **If the swelling persists in the legs after you stop wearing the Stanford hose, continue to wear them until the swelling decreases.**  REMOVE STOCKINGS AT LEAST DAILY FOR SKIN ASSESSMENT.   Do NOT let the stockings roll down, creating a tourniquet around the back of your knee. If you need to, leave the excess at the bottom of the stocking.   The best thing you can do to prevent blood clots is to walk around as much as possible, AT LEAST EVERY 1-2 HOURS.       CONSTIPATION PREVENTION:   Miralax or Senokot S/Amy-Colace and Stool softeners EVERY DAY while on pain meds.  Use other more aggressive over the counter LAXATIVES as needed for constipation (Examples: Milk of Magnesia, Dulcolax tabs or suppository, Magnesium Citrate, Fleet's Enema...etc.)   Drink lots of water.  Increase Fiber in diet.  Increase walking distance each day  DO NOT GO TOO LONG WITHOUT HAVING A BOWEL MOVEMENT!      ACTIVITY:  You will be FULL weight-bearing on your operative leg.  Please do not take yourself off of the walker too soon.  Please allow your outpatient therapist or surgeon to guide you.  You will be range of motion as tolerated to your operative knee.  Work on bending and straightening the knee and change positions often throughout the day.  Do not put anything behind the knee, keeping it in a bent position.  Elevate your affected extremity way above the level of the heart to reduce swelling 2 to 3 times a day, 20-30 minutes at a time.  Walk around at least every 1-2 hours while awake.  No heavy lifting, pulling, pushing or  straining.  Full Range of Motion to the Knee - working on bending and straightening     THERAPY  Outpatient Physical Therapy-bring your prescription to the clinic of your choice as soon as possible.    WOUND CARE:   Dry dressing changes every other day and as needed for soiling for 14 days. Start Saturday  DO NOT WET WOUND or apply any ointments, creams, lotions or antiseptics.  Ace wrap - apply your compression stocking and apply the ace wrap where the stocking stops for extra added compression to the knee.   May wet incision after you follow-up with your surgeon.   Ok to shower before then if able to keep wound from getting wet (plastic barrier, saran wrap or cling wrap and tape).   DO NOT TOUCH INCISION  Apply Ice to the Knee and thigh as much as possible.      URINARY RETENTION:  If you start having difficulty urinating, decrease the use of Pain pills and muscle relaxers and notify your primary care doctor.     PNEUMONIA PREVENTION:  Stay out of bed as much as possible and walk around every 1-2 hours.  Continue breathing exercises (Incentive Spirometry) every 1-2 hours while mobility is limited and while you are on pain pills.    INFECTION PREVENTION:  Proper handwashing before and after dressing changes. Do not wet the wound. Wound care instructions as written above. NOTIFY MD OF EXCESSIVE WOUND DRAINAGE.  No alcohol, smoking or tobacco products  Pets should not be allowed around the wound or the dressing.   Treat UTI and skin infections as soon as possible.  Pre-medicate with antibiotics prior to dental or surgical procedures.   If you are diabetic, MAINTAIN GOOD BLOOD SUGAR CONTROL (Below 150) DURING YOUR RECOVERY. If you see high numbers, notify your primary care doctor.     Call your SURGEON'S OFFICE ([phone]) if you experience the following signs and symptoms of infection:   Unusual redness, swelling, excessive, cloudy or foul smelling drainage at the incision site.   Persistent low grade temp OR a temp  greater than 102 F, unrelieved by Tylenol  Pain at surgical site, unrelieved by pain meds    Warning signs of a blood clot in your leg: (CALL YOUR SURGEON)  New onset or increasing pain in calf, new onset tenderness or redness above or below the knee or increasing swelling of your calf, ankle, or foot.  Warning signs that a blood clot has traveled to your lungs: (REPORT TO THE ER/CALL 911)  Sudden or increase in Shortness of breath, sudden onset of chest pains, or  Localized chest pain with coughing.         IF ANY ISSUES ARISE AND YOU FEEL THE NEED TO CALL YOUR PRIMARY CARE DOCTOR, PLEASE LET YOUR SURGEON KNOW AS WELL.     For emergencies, please report to OUR ([facility]) Emergency department and tell them to call YOUR SURGEON at [phone].     BEFORE MAKING ANY CHANGES TO THE MEDICAL CARE PLAN OR GOING TO THE EMERGENCY ROOM, PLEASE CONTACT THE SURGEON.    3rd floor nursing unit # [phone]  Use this number for questions about your discharge instructions or problems filling your discharge prescriptions.

## 2022-08-10 NOTE — PT/OT/SLP PROGRESS
Physical Therapy Treatment    Patient Name:  Tish Martin   MRN:  [id number]    Recommendations:     Discharge Recommendations:  outpatient PT   Discharge Equipment Recommendations: walker, rolling   Barriers to discharge: None    Assessment:     Tish Martin is a 66 y.o. female admitted with a medical diagnosis of Primary osteoarthritis of right knee.  She presents with the following impairments/functional limitations:  weakness, gait instability, impaired endurance, decreased safety awareness, pain, impaired functional mobility, decreased ROM.    Rehab Prognosis: Good; patient would benefit from acute skilled PT services to address these deficits and reach maximum level of function.    Recent Surgery: Procedure(s) (LRB):  ROBOTIC ARTHROPLASTY, KNEE, TOTAL (Right) 1 Day Post-Op    Plan:     During this hospitalization, patient to be seen BID to address the identified rehab impairments via gait training, therapeutic activities, therapeutic exercises and progress toward the following goals:    · Plan of Care Expires:  08/15/22    Subjective     Chief Complaint:   Patient/Family Comments/goals:   Pain/Comfort:  Pain Rating 1: 4/10  Location - Side 1: Right  Location 1: knee  Pain Addressed 1: Pre-medicate for activity, Other (see comments) (cold pack)  Pain Rating Post-Intervention 1: 4/10      Objective:     Communicated with RN prior to session.  Patient found up in chair with Other (comments) upon PT entry to room.     General Precautions: Standard, fall   Orthopedic Precautions:RLE weight bearing as tolerated   Braces: N/A  Respiratory Status: Room air     Functional Mobility:  · Transfers:     · Sit to Stand:  supervision with rolling walker  · Car Transfer: supervision with  rolling walker  using  Step Transfer  · Gait: 115' x2 with RW and SBA      Therapeutic Activities and Exercises:    Pt performed 1x10 of total knee HEP, handout provided      Patient left up in chair with all lines  intact..    GOALS:   Multidisciplinary Problems     Physical Therapy Goals        Problem: Physical Therapy    Goal Priority Disciplines Outcome Goal Variances Interventions   Physical Therapy Goal     PT, PT/OT Ongoing, Progressing     Description: Pt will improve functional independence by performing:    MET 08/10/2022 Bed mobility: mod I  Sit to stand: mod I  MET 08/10/2022 Car Transfer: SBA with rolling walker  Ambulation x 200'  feet with mod I and rolling walker  1 Step (Curb): SBA and rolling walker  MET 08/10/2022 3 Steps: Min A  and RW  right knee AROM flexion (in degrees): 100  right knee AROM extension (in degrees): 0   Independent with total knee HEP                       Time Tracking:     PT Received On:    PT Start Time: 1403  PT Stop Time: 1430  PT Total Time (min): 27 min     Billable Minutes: Gait Training 14 and Therapeutic Exercise 13    Treatment Type: Treatment  PT/PTA: PT           08/10/2022

## 2022-08-10 NOTE — PLAN OF CARE
Problem: Adult Inpatient Plan of Care  Goal: Plan of Care Review  Outcome: Ongoing, Progressing  Goal: Patient-Specific Goal (Individualized)  Outcome: Ongoing, Progressing  Goal: Absence of Hospital-Acquired Illness or Injury  Outcome: Ongoing, Progressing  Goal: Readiness for Transition of Care  Outcome: Ongoing, Progressing     Problem: Infection  Goal: Absence of Infection Signs and Symptoms  Outcome: Ongoing, Progressing

## 2022-08-11 VITALS
HEIGHT: 61 IN | OXYGEN SATURATION: 99 % | WEIGHT: 122.81 LBS | DIASTOLIC BLOOD PRESSURE: 87 MMHG | RESPIRATION RATE: 16 BRPM | BODY MASS INDEX: 23.19 KG/M2 | SYSTOLIC BLOOD PRESSURE: 137 MMHG | TEMPERATURE: 98 F | HEART RATE: 73 BPM

## 2022-08-11 LAB
ANION GAP SERPL CALC-SCNC: 8 MEQ/L
BUN SERPL-MCNC: 11.8 MG/DL (ref 9.8–20.1)
CALCIUM SERPL-MCNC: 8.5 MG/DL (ref 8.4–10.2)
CHLORIDE SERPL-SCNC: 106 MMOL/L (ref 98–107)
CO2 SERPL-SCNC: 30 MMOL/L (ref 23–31)
CREAT SERPL-MCNC: 0.59 MG/DL (ref 0.55–1.02)
CREAT/UREA NIT SERPL: 20
ERYTHROCYTE [DISTWIDTH] IN BLOOD BY AUTOMATED COUNT: 17.1 % (ref 11.5–17)
GFR SERPLBLD CREATININE-BSD FMLA CKD-EPI: >60 MLS/MIN/1.73/M2
GLUCOSE SERPL-MCNC: 87 MG/DL (ref 82–115)
HCT VFR BLD AUTO: 28 % (ref 37–47)
HGB BLD-MCNC: 8.9 GM/DL (ref 12–16)
MCH RBC QN AUTO: 25.4 PG (ref 27–31)
MCHC RBC AUTO-ENTMCNC: 31.8 MG/DL (ref 33–36)
MCV RBC AUTO: 79.8 FL (ref 80–94)
NRBC BLD AUTO-RTO: 0 %
PLATELET # BLD AUTO: 241 X10(3)/MCL (ref 130–400)
PMV BLD AUTO: 9.9 FL (ref 7.4–10.4)
POTASSIUM SERPL-SCNC: 3.7 MMOL/L (ref 3.5–5.1)
RBC # BLD AUTO: 3.51 X10(6)/MCL (ref 4.2–5.4)
SODIUM SERPL-SCNC: 144 MMOL/L (ref 136–145)
WBC # SPEC AUTO: 6.5 X10(3)/MCL (ref 4.5–11.5)

## 2022-08-11 PROCEDURE — 80048 BASIC METABOLIC PNL TOTAL CA: CPT | Performed by: SPECIALIST

## 2022-08-11 PROCEDURE — 63600175 PHARM REV CODE 636 W HCPCS: Performed by: SPECIALIST

## 2022-08-11 PROCEDURE — 36415 COLL VENOUS BLD VENIPUNCTURE: CPT | Performed by: SPECIALIST

## 2022-08-11 PROCEDURE — G0378 HOSPITAL OBSERVATION PER HR: HCPCS

## 2022-08-11 PROCEDURE — 25000003 PHARM REV CODE 250: Performed by: SPECIALIST

## 2022-08-11 PROCEDURE — 25000003 PHARM REV CODE 250: Performed by: NURSE PRACTITIONER

## 2022-08-11 PROCEDURE — 94761 N-INVAS EAR/PLS OXIMETRY MLT: CPT

## 2022-08-11 PROCEDURE — 96376 TX/PRO/DX INJ SAME DRUG ADON: CPT

## 2022-08-11 PROCEDURE — 85027 COMPLETE CBC AUTOMATED: CPT | Performed by: SPECIALIST

## 2022-08-11 PROCEDURE — 97116 GAIT TRAINING THERAPY: CPT

## 2022-08-11 PROCEDURE — 94799 UNLISTED PULMONARY SVC/PX: CPT

## 2022-08-11 PROCEDURE — 97110 THERAPEUTIC EXERCISES: CPT

## 2022-08-11 RX ORDER — METHOCARBAMOL 500 MG/1
500 TABLET, FILM COATED ORAL EVERY 8 HOURS PRN
Qty: 42 TABLET | Refills: 0 | Status: SHIPPED | OUTPATIENT
Start: 2022-08-11 | End: 2022-08-25

## 2022-08-11 RX ORDER — METHOTREXATE 2.5 MG/1
15 TABLET ORAL
Start: 2022-08-11

## 2022-08-11 RX ORDER — TRAMADOL HYDROCHLORIDE 50 MG/1
50 TABLET ORAL EVERY 4 HOURS PRN
Qty: 42 TABLET | Refills: 0 | Status: SHIPPED | OUTPATIENT
Start: 2022-08-11 | End: 2022-08-18

## 2022-08-11 RX ORDER — AMOXICILLIN 250 MG
1 CAPSULE ORAL 2 TIMES DAILY
Qty: 28 TABLET | Refills: 0 | Status: SHIPPED | OUTPATIENT
Start: 2022-08-11 | End: 2022-08-25

## 2022-08-11 RX ORDER — ONDANSETRON 4 MG/1
4 TABLET, ORALLY DISINTEGRATING ORAL EVERY 6 HOURS PRN
Qty: 30 TABLET | Refills: 1 | Status: SHIPPED | OUTPATIENT
Start: 2022-08-11 | End: 2022-08-26

## 2022-08-11 RX ORDER — ASPIRIN 81 MG/1
81 TABLET ORAL EVERY 12 HOURS
Qty: 84 TABLET | Refills: 0 | Status: SHIPPED | OUTPATIENT
Start: 2022-08-11 | End: 2022-09-22

## 2022-08-11 RX ADMIN — SENNOSIDES AND DOCUSATE SODIUM 2 TABLET: 50; 8.6 TABLET ORAL at 08:08

## 2022-08-11 RX ADMIN — KETOROLAC TROMETHAMINE 10 MG: 10 TABLET, FILM COATED ORAL at 05:08

## 2022-08-11 RX ADMIN — LISINOPRIL 40 MG: 10 TABLET ORAL at 08:08

## 2022-08-11 RX ADMIN — FOLIC ACID 1000 MCG: 1 TABLET ORAL at 08:08

## 2022-08-11 RX ADMIN — ACETAMINOPHEN 500 MG: 500 TABLET ORAL at 12:08

## 2022-08-11 RX ADMIN — DOCUSATE SODIUM 200 MG: 100 CAPSULE, LIQUID FILLED ORAL at 08:08

## 2022-08-11 RX ADMIN — ONDANSETRON 4 MG: 2 INJECTION INTRAMUSCULAR; INTRAVENOUS at 08:08

## 2022-08-11 RX ADMIN — ACETAMINOPHEN 500 MG: 500 TABLET ORAL at 11:08

## 2022-08-11 RX ADMIN — KETOROLAC TROMETHAMINE 10 MG: 10 TABLET, FILM COATED ORAL at 11:08

## 2022-08-11 RX ADMIN — ACETAMINOPHEN 500 MG: 500 TABLET ORAL at 08:08

## 2022-08-11 RX ADMIN — FAMOTIDINE 20 MG: 20 TABLET ORAL at 08:08

## 2022-08-11 RX ADMIN — ASPIRIN 81 MG CHEWABLE TABLET 81 MG: 81 TABLET CHEWABLE at 08:08

## 2022-08-11 RX ADMIN — TRAMADOL HYDROCHLORIDE 50 MG: 50 TABLET, COATED ORAL at 08:08

## 2022-08-11 RX ADMIN — KETOROLAC TROMETHAMINE 10 MG: 10 TABLET, FILM COATED ORAL at 12:08

## 2022-08-11 RX ADMIN — AMLODIPINE BESYLATE 10 MG: 5 TABLET ORAL at 08:08

## 2022-08-11 RX ADMIN — ACETAMINOPHEN 500 MG: 500 TABLET ORAL at 05:08

## 2022-08-11 NOTE — PLAN OF CARE
Problem: Adult Inpatient Plan of Care  Goal: Plan of Care Review  Outcome: Met  Goal: Patient-Specific Goal (Individualized)  Outcome: Met  Goal: Absence of Hospital-Acquired Illness or Injury  Outcome: Met  Goal: Optimal Comfort and Wellbeing  Outcome: Met  Goal: Readiness for Transition of Care  Outcome: Met     Problem: Infection  Goal: Absence of Infection Signs and Symptoms  Outcome: Met     Problem: Physical Therapy  Goal: Physical Therapy Goal  Description: Pt will improve functional independence by performing:    MET 08/10/2022 Bed mobility: mod I  Sit to stand: mod I  MET 08/10/2022 Car Transfer: SBA with rolling walker  Ambulation x 200'  feet with mod I and rolling walker  1 Step (Curb): SBA and rolling walker  MET 08/10/2022 3 Steps: Min A  and RW  right knee AROM flexion (in degrees): 100  right knee AROM extension (in degrees): 0   Independent with total knee HEP      Outcome: Met

## 2022-08-11 NOTE — PT/OT/SLP PROGRESS
Physical Therapy Treatment    Patient Name:  Tish Martin   MRN:  [id number]    Recommendations:     Discharge Recommendations:  outpatient PT   Discharge Equipment Recommendations: walker, rolling   Barriers to discharge: None    Assessment:     Tish Martin is a 66 y.o. female admitted with a medical diagnosis of Primary osteoarthritis of right knee.  She presents with the following impairments/functional limitations:  weakness, gait instability, impaired endurance, decreased safety awareness, pain, impaired functional mobility, decreased ROM .    Rehab Prognosis: Good; patient would benefit from acute skilled PT services to address these deficits and reach maximum level of function.    Recent Surgery: Procedure(s) (LRB):  ROBOTIC ARTHROPLASTY, KNEE, TOTAL (Right) 2 Days Post-Op    Plan:     During this hospitalization, patient to be seen BID to address the identified rehab impairments via gait training, therapeutic activities, therapeutic exercises and progress toward the following goals:    · Plan of Care Expires:  08/15/22    Subjective     Chief Complaint:   Patient/Family Comments/goals:   Pain/Comfort:  · Pain Rating 1: 0/10  · Pain Addressed 1: Pre-medicate for activity      Objective:     Communicated with RN prior to session.  Patient found supine with Other (comments) upon PT entry to room.     General Precautions: Standard, fall   Orthopedic Precautions:RLE weight bearing as tolerated   Braces: N/A  Respiratory Status: Room air     Functional Mobility:  · Bed Mobility:     · Scooting: modified independence  · Supine to Sit: modified independence  · Transfers:     · Sit to Stand:  modified independence with rolling walker  · Gait: 200ft w RW and mod I      AM-PAC 6 CLICK MOBILITY          Therapeutic Activities and Exercises:    Pt performed 1x10 of total knee HEP, handout provided     (In degrees) AROM PROM   R knee flexion 97    R knee extension 0        Patient left up in chair with call  button in reach..    GOALS:   Multidisciplinary Problems     Physical Therapy Goals     Not on file          Multidisciplinary Problems (Resolved)        Problem: Physical Therapy    Goal Priority Disciplines Outcome Goal Variances Interventions   Physical Therapy Goal   (Resolved)     PT, PT/OT Met     Description: Pt will improve functional independence by performing:    MET 08/10/2022 Bed mobility: mod I  MET 08/11/2022 Sit to stand: mod I  MET 08/10/2022 Car Transfer: SBA with rolling walker  MET 08/11/2022 Ambulation x 200'  feet with mod I and rolling walker  1 Step (Curb): SBA and rolling walker  MET 08/10/2022 3 Steps: Min A  and RW  right knee AROM flexion (in degrees): 100  MET 08/11/2022 right knee AROM extension (in degrees): 0   MET 08/11/2022 Independent with total knee HEP                       Time Tracking:     PT Received On:    PT Start Time: 0938     PT Stop Time: 1005  PT Total Time (min): 27 min     Billable Minutes: Gait Training 10 and Therapeutic Exercise 17    Treatment Type: Treatment  PT/PTA: PT         Pt tolerated session well, educated on safety awareness, proper hydration, HEP, mobility, and pain management strategies to reduce risk of medical complications upon d/c home. All questions/concerns addressed. This note to serve as d/c summary.    08/11/2022

## 2022-08-11 NOTE — PLAN OF CARE
Problem: Physical Therapy  Goal: Physical Therapy Goal  Description: Pt will improve functional independence by performing:    MET 08/10/2022 Bed mobility: mod I  MET 08/11/2022 Sit to stand: mod I  MET 08/10/2022 Car Transfer: SBA with rolling walker  MET 08/11/2022 Ambulation x 200'  feet with mod I and rolling walker  1 Step (Curb): SBA and rolling walker  MET 08/10/2022 3 Steps: Min A  and RW  right knee AROM flexion (in degrees): 100  MET 08/11/2022 right knee AROM extension (in degrees): 0   MET 08/11/2022 Independent with total knee HEP      8/11/2022 1142 by Deneen Rene, PT  Outcome: Adequate for Care Transition

## 2022-08-16 LAB — VIEW PATHOLOGY REPORT (RELIAPATH): NORMAL

## 2022-08-19 NOTE — DISCHARGE SUMMARY
[facility]  Discharge Note  Short Stay    Admit Date: 8/9/2022    Discharge Date and Time: 8/11/2022 12:19 PM     Attending Physician: No att. providers found     Discharge Provider: Harley Carroll    Diagnoses:  Active Hospital Problems    Diagnosis  POA    *Primary osteoarthritis of right knee [M17.11]  Yes      Resolved Hospital Problems   No resolved problems to display.       Discharged Condition: good    Hospital Course:    Patient presented for elective robotic assisted right total knee arthroplasty The patient had no complications during the hospital stay and was discharged home in stable condition full weightbearing with assistance of a walker. The patient was given a consult for physical therapy and rehab as well as a follow-up appointment in our office in 2 weeks for reevaluation. The patient was instructed on wound care and dressing change as well as to continue the CARRIE hose while at home. Prescriptions for continuation of the VTE prophylaxis and pain control were given. The patients home medications were resumed please see discharge med rec for that.     Final Diagnoses: Same as principal problem.    Disposition: Home or Self Care    Follow up/Patient Instructions:    Medications:  Reconciled Home Medications:      Medication List      START taking these medications    aspirin 81 MG EC tablet  Commonly known as: ECOTRIN  Take 1 tablet (81 mg total) by mouth every 12 (twelve) hours.     methocarbamoL 500 MG Tab  Commonly known as: ROBAXIN  Take 1 tablet (500 mg total) by mouth every 8 (eight) hours as needed (Muscle spasms/Thigh Pain).     ondansetron 4 MG Tbdl  Commonly known as: ZOFRAN-ODT  Take 1 tablet (4 mg total) by mouth every 6 (six) hours as needed (nausea/vomiting).     senna-docusate 8.6-50 mg 8.6-50 mg per tablet  Commonly known as: PERICOLACE  Take 1 tablet by mouth 2 (two) times a day. for 14 days        CHANGE how you take these medications    HUMIRA 10 mg/0.2 mL  Sykt  Generic drug: adalimumab  Inject 0.2 mLs (10 mg total) into the skin every 14 (fourteen) days. HOLD FOR 2-3 WEEKS POST-OP  What changed: additional instructions     methotrexate 2.5 MG Tab  Take 6 tablets (15 mg total) by mouth every 7 days. HOLD FOR 2-3 WEEKS POST-OP  What changed:   · additional instructions  · Another medication with the same name was removed. Continue taking this medication, and follow the directions you see here.        CONTINUE taking these medications    amLODIPine 10 MG tablet  Commonly known as: NORVASC  Take 10 mg by mouth once daily.     ascorbic acid (vitamin C) 1000 MG tablet  Commonly known as: VITAMIN C  Take 1,000 mg by mouth once daily.     benazepriL 40 MG tablet  Commonly known as: LOTENSIN  Take 40 mg by mouth once daily.     cetirizine 10 MG tablet  Commonly known as: ZYRTEC  Zyrtec 10 mg tablet   Take 1 tablet every day by oral route.     folic acid 1 MG tablet  Commonly known as: FOLVITE  Take 1,000 mcg by mouth once daily.     multivitamin capsule  Take 1 capsule by mouth once daily.     rosuvastatin 5 MG tablet  Commonly known as: CRESTOR  Take 5 mg by mouth once daily.        ASK your doctor about these medications    traMADoL 50 mg tablet  Commonly known as: ULTRAM  Take 1 tablet (50 mg total) by mouth every 4 (four) hours as needed for Pain.  Ask about: Should I take this medication?          No discharge procedures on file.   Follow-up Information     Harley Carroll MD Follow up.    Specialty: Orthopedic Surgery  Why: Ortho follow up appt. on Monday August 22nd at 3:45 pm with Dr. Glen Rowe's Phys. Assistant.  Contact information:  [address] [facility] [address]  [address]  [facility] [address]  [phone]             [facility] Follow up.    Why: This is the equipment company., Call if you have questions or concerns.  Contact information:  [phone]           [facility] Follow up.    Why: This is the outpatient therapy facility. They will contact  pt with appt date & time. Call if you have questions or concerns.                       Discharge Procedure Orders (must include Diet, Follow-up, Activity):  No discharge procedures on file.

## 2022-08-22 ENCOUNTER — HOSPITAL ENCOUNTER (OUTPATIENT)
Dept: RADIOLOGY | Facility: CLINIC | Age: 67
Discharge: HOME | End: 2022-08-22
Attending: PHYSICIAN ASSISTANT
Payer: MEDICARE

## 2022-08-22 ENCOUNTER — OFFICE VISIT (OUTPATIENT)
Dept: ORTHOPEDICS | Facility: CLINIC | Age: 67
End: 2022-08-22
Payer: MEDICARE

## 2022-08-22 VITALS
HEIGHT: 61 IN | WEIGHT: 121 LBS | SYSTOLIC BLOOD PRESSURE: 137 MMHG | HEART RATE: 113 BPM | DIASTOLIC BLOOD PRESSURE: 82 MMHG | BODY MASS INDEX: 22.84 KG/M2

## 2022-08-22 DIAGNOSIS — Z96.651 S/P TOTAL KNEE ARTHROPLASTY, RIGHT: Primary | ICD-10-CM

## 2022-08-22 DIAGNOSIS — M17.11 PRIMARY OSTEOARTHRITIS OF RIGHT KNEE: ICD-10-CM

## 2022-08-22 PROCEDURE — 1160F RVW MEDS BY RX/DR IN RCRD: CPT | Mod: CPTII,,, | Performed by: PHYSICIAN ASSISTANT

## 2022-08-22 PROCEDURE — 1159F MED LIST DOCD IN RCRD: CPT | Mod: CPTII,,, | Performed by: PHYSICIAN ASSISTANT

## 2022-08-22 PROCEDURE — 4010F ACE/ARB THERAPY RXD/TAKEN: CPT | Mod: CPTII,,, | Performed by: PHYSICIAN ASSISTANT

## 2022-08-22 PROCEDURE — 1160F PR REVIEW ALL MEDS BY PRESCRIBER/CLIN PHARMACIST DOCUMENTED: ICD-10-PCS | Mod: CPTII,,, | Performed by: PHYSICIAN ASSISTANT

## 2022-08-22 PROCEDURE — 99024 POSTOP FOLLOW-UP VISIT: CPT | Mod: ,,, | Performed by: PHYSICIAN ASSISTANT

## 2022-08-22 PROCEDURE — 1101F PR PT FALLS ASSESS DOC 0-1 FALLS W/OUT INJ PAST YR: ICD-10-PCS | Mod: CPTII,,, | Performed by: PHYSICIAN ASSISTANT

## 2022-08-22 PROCEDURE — 3075F PR MOST RECENT SYSTOLIC BLOOD PRESS GE 130-139MM HG: ICD-10-PCS | Mod: CPTII,,, | Performed by: PHYSICIAN ASSISTANT

## 2022-08-22 PROCEDURE — 3079F DIAST BP 80-89 MM HG: CPT | Mod: CPTII,,, | Performed by: PHYSICIAN ASSISTANT

## 2022-08-22 PROCEDURE — 3008F BODY MASS INDEX DOCD: CPT | Mod: CPTII,,, | Performed by: PHYSICIAN ASSISTANT

## 2022-08-22 PROCEDURE — 3288F FALL RISK ASSESSMENT DOCD: CPT | Mod: CPTII,,, | Performed by: PHYSICIAN ASSISTANT

## 2022-08-22 PROCEDURE — 99024 PR POST-OP FOLLOW-UP VISIT: ICD-10-PCS | Mod: ,,, | Performed by: PHYSICIAN ASSISTANT

## 2022-08-22 PROCEDURE — 3079F PR MOST RECENT DIASTOLIC BLOOD PRESSURE 80-89 MM HG: ICD-10-PCS | Mod: CPTII,,, | Performed by: PHYSICIAN ASSISTANT

## 2022-08-22 PROCEDURE — 1101F PT FALLS ASSESS-DOCD LE1/YR: CPT | Mod: CPTII,,, | Performed by: PHYSICIAN ASSISTANT

## 2022-08-22 PROCEDURE — 73562 X-RAY EXAM OF KNEE 3: CPT | Mod: RT,,, | Performed by: PHYSICIAN ASSISTANT

## 2022-08-22 PROCEDURE — 1159F PR MEDICATION LIST DOCUMENTED IN MEDICAL RECORD: ICD-10-PCS | Mod: CPTII,,, | Performed by: PHYSICIAN ASSISTANT

## 2022-08-22 PROCEDURE — 73562 XR KNEE 3 VIEW RIGHT: ICD-10-PCS | Mod: RT,,, | Performed by: PHYSICIAN ASSISTANT

## 2022-08-22 PROCEDURE — 4010F PR ACE/ARB THEARPY RXD/TAKEN: ICD-10-PCS | Mod: CPTII,,, | Performed by: PHYSICIAN ASSISTANT

## 2022-08-22 PROCEDURE — 3075F SYST BP GE 130 - 139MM HG: CPT | Mod: CPTII,,, | Performed by: PHYSICIAN ASSISTANT

## 2022-08-22 PROCEDURE — 3008F PR BODY MASS INDEX (BMI) DOCUMENTED: ICD-10-PCS | Mod: CPTII,,, | Performed by: PHYSICIAN ASSISTANT

## 2022-08-22 PROCEDURE — 3288F PR FALLS RISK ASSESSMENT DOCUMENTED: ICD-10-PCS | Mod: CPTII,,, | Performed by: PHYSICIAN ASSISTANT

## 2022-08-22 NOTE — PROGRESS NOTES
Chief Complaint:   Chief Complaint   Patient presents with    Post-op Evaluation     Post-op for RT TKA on 08/09/22- Pt states she is feeling well with a minor stiffness and a numbness in her RT knee. Pt is current in physical therapy and she feels like is helping with her recovery.       History of present illness:    66-year-old female presents office today for follow-up on her right knee.  She is 2 weeks status post right total knee arthroplasty overall doing well.  She does have some intermittent pain and stiffness.  She is ambulating with a walker and attending physical therapy    Past Medical History:   Diagnosis Date    High cholesterol     Hypertension     RA (rheumatoid arthritis)        Past Surgical History:   Procedure Laterality Date    right ovary removal      ROBOTIC ARTHROPLASTY, KNEE Right 8/9/2022    Procedure: ROBOTIC ARTHROPLASTY, KNEE, TOTAL;  Surgeon: Abhinav Carroll MD;  [facility]: [facility];  Service: Orthopedics;  Laterality: Right;       Current Outpatient Medications   Medication Sig    amLODIPine (NORVASC) 10 MG tablet Take 10 mg by mouth once daily.    ascorbic acid, vitamin C, (VITAMIN C) 1000 MG tablet Take 1,000 mg by mouth once daily.    benazepriL (LOTENSIN) 40 MG tablet Take 40 mg by mouth once daily.    folic acid (FOLVITE) 1 MG tablet Take 1,000 mcg by mouth once daily.    multivitamin capsule Take 1 capsule by mouth once daily.    rosuvastatin (CRESTOR) 5 MG tablet Take 5 mg by mouth once daily.    adalimumab (HUMIRA) 10 mg/0.2 mL SyKt Inject 0.2 mLs (10 mg total) into the skin every 14 (fourteen) days. HOLD FOR 2-3 WEEKS POST-OP    aspirin (ECOTRIN) 81 MG EC tablet Take 1 tablet (81 mg total) by mouth every 12 (twelve) hours.    cetirizine (ZYRTEC) 10 MG tablet Zyrtec 10 mg tablet   Take 1 tablet every day by oral route.    methocarbamoL (ROBAXIN) 500 MG Tab Take 1 tablet (500 mg total) by mouth every 8 (eight) hours as needed (Muscle spasms/Thigh Pain).     "methotrexate 2.5 MG Tab Take 6 tablets (15 mg total) by mouth every 7 days. HOLD FOR 2-3 WEEKS POST-OP    ondansetron (ZOFRAN-ODT) 4 MG TbDL Take 1 tablet (4 mg total) by mouth every 6 (six) hours as needed (nausea/vomiting).    senna-docusate 8.6-50 mg (PERICOLACE) 8.6-50 mg per tablet Take 1 tablet by mouth 2 (two) times a day. for 14 days     No current facility-administered medications for this visit.       Review of patient's allergies indicates:   Allergen Reactions    Opioids - morphine analogues Hallucinations    Codeine Nausea And Vomiting    Lortab [hydrocodone-acetaminophen] Nausea And Vomiting       Family History   Family history unknown: Yes       Social History     Socioeconomic History    Marital status:    Tobacco Use    Smoking status: Never Smoker    Smokeless tobacco: Never Used   Substance and Sexual Activity    Alcohol use: Never    Drug use: Never    Sexual activity: Not Currently         Review of Systems:    Constitution:   Denies chills, fever, and sweats.  HENT:   Denies headaches or blurry vision.  Cardiovascular:  Denies chest pain or irregular heart beat.  Respiratory:   Denies cough or shortness of breath.  Gastrointestinal:  Denies abdominal pain, nausea, or vomiting.  Musculoskeletal:   Denies muscle cramps.  Neurological:   Denies dizziness or focal weakness.  Psychiatric/Behavior: Normal mental status.  Hematology/Lymph:  Denies bleeding problem or easy bruising/bleeding.  Skin:    Denies rash or suspicious lesions.    Examination:    Vital Signs:    Vitals:    08/22/22 1557   BP: 137/82   Pulse: (!) 113   Weight: 54.9 kg (121 lb)   Height: 5' 1" (1.549 m)       Body mass index is 22.86 kg/m².    Constitution:   Well-developed, well nourished patient in no acute distress.  Neurological:   Alert and oriented x 3 and cooperative to examination.     Psychiatric/Behavior: Normal mental status.  Respiratory:   No shortness of breath.  Eyes:    Extraoccular muscles " intact  Skin:    No scars, rash or suspicious lesions.    Physical Exam:     Right Knee   Mild effusion, no redness    Surgical incision C/D/I with staples   Active flexion 90 degrees   Active extension 5 degrees    Thigh and calf compartments soft and compressible    NVI distally Weakness of the  knee was observed.    X-Ray Knee:   Three views of the right knee were taken in the office today   IMPRESSIONS RADIOLOGY TEST   X-ray of knee was performed and show intact  knee implant without subsidence or loosening        Assessment:     Primary osteoarthritis of right knee  -     Cancel: X-Ray Knee Complete 4 Or More Views Right; Future; Expected date: 08/22/2022    S/P total knee arthroplasty, right        Plan:      Staples removed today.  She will continue with physical therapy and transition from a walker to a cane once comfortable.  She will follow-up in 3 months for repeat evaluation        Follow up in about 3 months (around 11/22/2022).    DISCLAIMER: This note may have been dictated using voice recognition software and may contain grammatical errors.

## 2022-10-13 ENCOUNTER — TELEPHONE (OUTPATIENT)
Dept: ORTHOPEDICS | Facility: CLINIC | Age: 67
End: 2022-10-13
Payer: MEDICARE

## 2022-10-25 ENCOUNTER — TELEPHONE (OUTPATIENT)
Dept: ORTHOPEDICS | Facility: CLINIC | Age: 67
End: 2022-10-25
Payer: MEDICARE

## 2022-12-05 ENCOUNTER — OFFICE VISIT (OUTPATIENT)
Dept: ORTHOPEDICS | Facility: CLINIC | Age: 67
End: 2022-12-05
Payer: MEDICARE

## 2022-12-05 VITALS
DIASTOLIC BLOOD PRESSURE: 78 MMHG | BODY MASS INDEX: 22.84 KG/M2 | HEART RATE: 57 BPM | HEIGHT: 61 IN | WEIGHT: 121 LBS | SYSTOLIC BLOOD PRESSURE: 131 MMHG

## 2022-12-05 DIAGNOSIS — Z96.651 S/P TOTAL KNEE ARTHROPLASTY, RIGHT: Primary | ICD-10-CM

## 2022-12-05 PROCEDURE — 3075F PR MOST RECENT SYSTOLIC BLOOD PRESS GE 130-139MM HG: ICD-10-PCS | Mod: CPTII,,, | Performed by: SPECIALIST

## 2022-12-05 PROCEDURE — 3078F PR MOST RECENT DIASTOLIC BLOOD PRESSURE < 80 MM HG: ICD-10-PCS | Mod: CPTII,,, | Performed by: SPECIALIST

## 2022-12-05 PROCEDURE — 4010F PR ACE/ARB THEARPY RXD/TAKEN: ICD-10-PCS | Mod: CPTII,,, | Performed by: SPECIALIST

## 2022-12-05 PROCEDURE — 1159F PR MEDICATION LIST DOCUMENTED IN MEDICAL RECORD: ICD-10-PCS | Mod: CPTII,,, | Performed by: SPECIALIST

## 2022-12-05 PROCEDURE — 3008F BODY MASS INDEX DOCD: CPT | Mod: CPTII,,, | Performed by: SPECIALIST

## 2022-12-05 PROCEDURE — 99213 PR OFFICE/OUTPT VISIT, EST, LEVL III, 20-29 MIN: ICD-10-PCS | Mod: ,,, | Performed by: SPECIALIST

## 2022-12-05 PROCEDURE — 3008F PR BODY MASS INDEX (BMI) DOCUMENTED: ICD-10-PCS | Mod: CPTII,,, | Performed by: SPECIALIST

## 2022-12-05 PROCEDURE — 1159F MED LIST DOCD IN RCRD: CPT | Mod: CPTII,,, | Performed by: SPECIALIST

## 2022-12-05 PROCEDURE — 3288F PR FALLS RISK ASSESSMENT DOCUMENTED: ICD-10-PCS | Mod: CPTII,,, | Performed by: SPECIALIST

## 2022-12-05 PROCEDURE — 4010F ACE/ARB THERAPY RXD/TAKEN: CPT | Mod: CPTII,,, | Performed by: SPECIALIST

## 2022-12-05 PROCEDURE — 3078F DIAST BP <80 MM HG: CPT | Mod: CPTII,,, | Performed by: SPECIALIST

## 2022-12-05 PROCEDURE — 99213 OFFICE O/P EST LOW 20 MIN: CPT | Mod: ,,, | Performed by: SPECIALIST

## 2022-12-05 PROCEDURE — 1160F PR REVIEW ALL MEDS BY PRESCRIBER/CLIN PHARMACIST DOCUMENTED: ICD-10-PCS | Mod: CPTII,,, | Performed by: SPECIALIST

## 2022-12-05 PROCEDURE — 1160F RVW MEDS BY RX/DR IN RCRD: CPT | Mod: CPTII,,, | Performed by: SPECIALIST

## 2022-12-05 PROCEDURE — 1101F PR PT FALLS ASSESS DOC 0-1 FALLS W/OUT INJ PAST YR: ICD-10-PCS | Mod: CPTII,,, | Performed by: SPECIALIST

## 2022-12-05 PROCEDURE — 3288F FALL RISK ASSESSMENT DOCD: CPT | Mod: CPTII,,, | Performed by: SPECIALIST

## 2022-12-05 PROCEDURE — 3075F SYST BP GE 130 - 139MM HG: CPT | Mod: CPTII,,, | Performed by: SPECIALIST

## 2022-12-05 PROCEDURE — 1101F PT FALLS ASSESS-DOCD LE1/YR: CPT | Mod: CPTII,,, | Performed by: SPECIALIST

## 2022-12-05 NOTE — PROGRESS NOTES
Past Medical History:   Diagnosis Date    High cholesterol     Hypertension     RA (rheumatoid arthritis)        Past Surgical History:   Procedure Laterality Date    right ovary removal      ROBOTIC ARTHROPLASTY, KNEE Right 8/9/2022    Procedure: ROBOTIC ARTHROPLASTY, KNEE, TOTAL;  Surgeon: Abhinav Carroll MD;  Location: [facility];  Service: Orthopedics;  Laterality: Right;       Current Outpatient Medications   Medication Sig    adalimumab (HUMIRA) 10 mg/0.2 mL SyKt Inject 0.2 mLs (10 mg total) into the skin every 14 (fourteen) days. HOLD FOR 2-3 WEEKS POST-OP    amLODIPine (NORVASC) 10 MG tablet Take 10 mg by mouth once daily.    ascorbic acid, vitamin C, (VITAMIN C) 1000 MG tablet Take 1,000 mg by mouth once daily.    aspirin (ECOTRIN) 81 MG EC tablet Take 1 tablet (81 mg total) by mouth every 12 (twelve) hours.    benazepriL (LOTENSIN) 40 MG tablet Take 40 mg by mouth once daily.    cetirizine (ZYRTEC) 10 MG tablet Zyrtec 10 mg tablet   Take 1 tablet every day by oral route.    folic acid (FOLVITE) 1 MG tablet Take 1,000 mcg by mouth once daily.    methotrexate 2.5 MG Tab Take 6 tablets (15 mg total) by mouth every 7 days. HOLD FOR 2-3 WEEKS POST-OP    multivitamin capsule Take 1 capsule by mouth once daily.    rosuvastatin (CRESTOR) 5 MG tablet Take 5 mg by mouth once daily.     No current facility-administered medications for this visit.       Review of patient's allergies indicates:   Allergen Reactions    Opioids - morphine analogues Hallucinations    Codeine Nausea And Vomiting    Lortab [hydrocodone-acetaminophen] Nausea And Vomiting       Family History   Family history unknown: Yes       Social History     Socioeconomic History    Marital status:    Tobacco Use    Smoking status: Never    Smokeless tobacco: Never   Substance and Sexual Activity    Alcohol use: Never    Drug use: Never    Sexual activity: Not Currently       Chief Complaint:   Chief Complaint   Patient presents with    Right Knee -  "Follow-up     Follow-up for right knee sx on 8/9/22. Knee is doing great. Not having any pain, a little numbness. Able to bend it good and walk on it well. Not having any issues with it.       Consulting Physician: No ref. provider found    History of present illness:    This is a 67 y.o. year old female who is doing great 3 months postop from right total knee arthroplasty.  She is no pain.  She has regained her quality of life.  She is no giving way or instability.  Review of Systems:    Constitution:   Denies chills, fever, and sweats.  HENT:   Denies headaches or blurry vision.  Cardiovascular:  Denies chest pain or irregular heart beat.  Respiratory:   Denies cough or shortness of breath.  Gastrointestinal:  Denies abdominal pain, nausea, or vomiting.  Musculoskeletal:   Denies muscle cramps.  Neurological:   Denies dizziness or focal weakness.  Psychiatric/Behavior: Normal mental status.  Hematology/Lymph:  Denies bleeding problem or easy bruising/bleeding.  Skin:    Denies rash or suspicious lesions.    Examination:    Vital Signs:    Vitals:    12/05/22 1246   BP: 131/78   Pulse: (!) 57   Weight: 54.9 kg (121 lb)   Height: 5' 1" (1.549 m)       Body mass index is 22.86 kg/m².    Constitution:   Well-developed, well nourished patient in no acute distress.  Neurological:   Alert and oriented x 3 and cooperative to examination.     Psychiatric/Behavior: Normal mental status.  Respiratory:   No shortness of breath.  Eyes:    Extraoccular muscles intact  Skin:    No scars, rash or suspicious lesions.    Physical Exam:  Right knee exam:   2+ pulses   Normal sensory and motor function   No swelling, no redness, no effusion, no increased heat   Healed incision with nice remodeling of the soft tissues   Stable collateral ligaments that are symmetrically balanced throughout range of motion   Normal gait  Range of motion 0° to 125° with no tenderness to palpation or range of motion         Assessment: S/P total knee " arthroplasty, right        Plan:  Activities as tolerated and follow up in 9 months for clinical exam and radiographs of the right knee      DISCLAIMER: This note may have been dictated using voice recognition software and may contain grammatical errors.     NOTE: Consult report sent to referring provider via [email or web address] EMR.

## 2023-07-31 ENCOUNTER — TELEPHONE (OUTPATIENT)
Dept: ORTHOPEDICS | Facility: CLINIC | Age: 68
End: 2023-07-31
Payer: MEDICARE

## 2024-08-21 ENCOUNTER — HOSPITAL ENCOUNTER (OUTPATIENT)
Dept: RADIOLOGY | Facility: CLINIC | Age: 69
Discharge: HOME | End: 2024-08-21
Attending: PHYSICIAN ASSISTANT
Payer: MEDICARE

## 2024-08-21 ENCOUNTER — OFFICE VISIT (OUTPATIENT)
Dept: ORTHOPEDICS | Facility: CLINIC | Age: 69
End: 2024-08-21
Payer: MEDICARE

## 2024-08-21 VITALS
HEART RATE: 71 BPM | BODY MASS INDEX: 24.51 KG/M2 | SYSTOLIC BLOOD PRESSURE: 121 MMHG | HEIGHT: 61 IN | WEIGHT: 129.81 LBS | DIASTOLIC BLOOD PRESSURE: 77 MMHG

## 2024-08-21 DIAGNOSIS — Z96.651 S/P TOTAL KNEE ARTHROPLASTY, RIGHT: ICD-10-CM

## 2024-08-21 DIAGNOSIS — M25.461 KNEE EFFUSION, RIGHT: ICD-10-CM

## 2024-08-21 DIAGNOSIS — Z96.651 S/P TOTAL KNEE ARTHROPLASTY, RIGHT: Primary | ICD-10-CM

## 2024-08-21 PROCEDURE — 3008F BODY MASS INDEX DOCD: CPT | Mod: CPTII,,, | Performed by: PHYSICIAN ASSISTANT

## 2024-08-21 PROCEDURE — 73564 X-RAY EXAM KNEE 4 OR MORE: CPT | Mod: RT,,, | Performed by: PHYSICIAN ASSISTANT

## 2024-08-21 PROCEDURE — 1159F MED LIST DOCD IN RCRD: CPT | Mod: CPTII,,, | Performed by: PHYSICIAN ASSISTANT

## 2024-08-21 PROCEDURE — 3288F FALL RISK ASSESSMENT DOCD: CPT | Mod: CPTII,,, | Performed by: PHYSICIAN ASSISTANT

## 2024-08-21 PROCEDURE — 3078F DIAST BP <80 MM HG: CPT | Mod: CPTII,,, | Performed by: PHYSICIAN ASSISTANT

## 2024-08-21 PROCEDURE — 1160F RVW MEDS BY RX/DR IN RCRD: CPT | Mod: CPTII,,, | Performed by: PHYSICIAN ASSISTANT

## 2024-08-21 PROCEDURE — 3074F SYST BP LT 130 MM HG: CPT | Mod: CPTII,,, | Performed by: PHYSICIAN ASSISTANT

## 2024-08-21 PROCEDURE — 1101F PT FALLS ASSESS-DOCD LE1/YR: CPT | Mod: CPTII,,, | Performed by: PHYSICIAN ASSISTANT

## 2024-08-21 PROCEDURE — 99213 OFFICE O/P EST LOW 20 MIN: CPT | Mod: ,,, | Performed by: PHYSICIAN ASSISTANT

## 2024-08-21 RX ORDER — PREDNISONE 20 MG/1
20 TABLET ORAL DAILY
Qty: 7 TABLET | Refills: 0 | Status: SHIPPED | OUTPATIENT
Start: 2024-08-21 | End: 2024-08-28

## 2024-08-21 NOTE — PROGRESS NOTES
Chief Complaint:   Chief Complaint   Patient presents with    Right Knee - Pain, Swelling     Pt reports a moderate numbness, tingling, swelling and radiating pain on the lower leg, most precisely on the lateral of her calf. Pain is ongoing for 2 weeks. The swelling is intermittent. Pt tried ice and elevation and it helped to decrease the swelling.         History of present illness:    68-year-old female presents office today for evaluation for right knee pain and swelling.  She really has no pain but noted some swelling last 2 weeks.  She has tried some ice and elevation with little relief in the swelling.  She has a history of right total knee arthroplasty done 2 years ago.  She does have a history of rheumatoid arthritis and currently taking Humira and methotrexate.  She does state that there was a lapse in the medication with restarting her methotrexate 8 a couple weeks ago    Past Medical History:   Diagnosis Date    High cholesterol     Hypertension     RA (rheumatoid arthritis)        Past Surgical History:   Procedure Laterality Date    right ovary removal      ROBOTIC ARTHROPLASTY, KNEE Right 8/9/2022    Procedure: ROBOTIC ARTHROPLASTY, KNEE, TOTAL;  Surgeon: Abhinav Carroll MD;  Location: [facility];  Service: Orthopedics;  Laterality: Right;       Current Outpatient Medications   Medication Sig    adalimumab (HUMIRA) 10 mg/0.2 mL SyKt Inject 0.2 mLs (10 mg total) into the skin every 14 (fourteen) days. HOLD FOR 2-3 WEEKS POST-OP    amLODIPine (NORVASC) 10 MG tablet Take 10 mg by mouth once daily.    ascorbic acid, vitamin C, (VITAMIN C) 1000 MG tablet Take 1,000 mg by mouth once daily.    cetirizine (ZYRTEC) 10 MG tablet Zyrtec 10 mg tablet   Take 1 tablet every day by oral route.    folic acid (FOLVITE) 1 MG tablet Take 1,000 mcg by mouth once daily.    methotrexate 2.5 MG Tab Take 6 tablets (15 mg total) by mouth every 7 days. HOLD FOR 2-3 WEEKS POST-OP    multivitamin capsule Take 1 capsule by mouth once  "daily.    rosuvastatin (CRESTOR) 5 MG tablet Take 5 mg by mouth once daily.    aspirin (ECOTRIN) 81 MG EC tablet Take 1 tablet (81 mg total) by mouth every 12 (twelve) hours.    benazepriL (LOTENSIN) 40 MG tablet Take 40 mg by mouth once daily. (Patient not taking: Reported on 8/21/2024)    predniSONE (DELTASONE) 20 MG tablet Take 1 tablet (20 mg total) by mouth once daily. for 7 days     No current facility-administered medications for this visit.       Review of patient's allergies indicates:   Allergen Reactions    Opioids - morphine analogues Hallucinations    Codeine Nausea And Vomiting    Lortab [hydrocodone-acetaminophen] Nausea And Vomiting       Family History   Family history unknown: Yes       Social History     Socioeconomic History    Marital status:    Tobacco Use    Smoking status: Never    Smokeless tobacco: Never   Substance and Sexual Activity    Alcohol use: Never    Drug use: Never    Sexual activity: Not Currently         Review of Systems:    Constitution:   Denies chills, fever, and sweats.  HENT:   Denies headaches or blurry vision.  Cardiovascular:  Denies chest pain or irregular heart beat.  Respiratory:   Denies cough or shortness of breath.  Gastrointestinal:  Denies abdominal pain, nausea, or vomiting.  Musculoskeletal:   Denies muscle cramps.  Neurological:   Denies dizziness or focal weakness.  Psychiatric/Behavior: Normal mental status.  Hematology/Lymph:  Denies bleeding problem or easy bruising/bleeding.  Skin:    Denies rash or suspicious lesions.    Examination:    Vital Signs:    Vitals:    08/21/24 1443   BP: 121/77   Pulse: 71   Weight: 58.9 kg (129 lb 12.8 oz)   Height: 5' 1" (1.549 m)       Body mass index is 24.53 kg/m².    Constitution:   Well-developed, well nourished patient in no acute distress.  Neurological:   Alert and oriented x 3 and cooperative to examination.     Psychiatric/Behavior: Normal mental status.  Respiratory:   No shortness of breath.  Nonlabored " breathing  Cardiovascular:           Regular rate and rhythm  Eyes:    Extraoccular muscles intact  Skin:    No scars, rash or suspicious lesions.    Physical Exam:     right Knee     Small effusion, warmth or tenderness.    Well healed scar    No instability of the knee in mid or deep flexion     Active flexion 120 degrees     Active extension 0 degrees     No weakness was observed.    Sensation intact distally    Intact pedal pulses     A complete knee x-ray with standing 3 views was performed -of right knee.     Impressions Radiology Test   X-ray of knee was performed intact  knee implant without signs of loosening or subsidence.        Assessment:     S/P total knee arthroplasty, right  -     X-Ray Knee Complete 4 Or More Views Right; Future; Expected date: 08/21/2024    Knee effusion, right    Other orders  -     predniSONE (DELTASONE) 20 MG tablet; Take 1 tablet (20 mg total) by mouth once daily. for 7 days  Dispense: 7 tablet; Refill: 0        Plan:      I have discussed exam and x-ray findings with the patient today.  She has a mild effusion but no pain.  It is very likely that the medication that was not timed right such as her methotrexate may have induced some swelling.  I will put her on some daily prednisone for one-week to help reduce the swelling.  Activities as tolerated and she will follow up with us as needed.  She will contact us if her swelling returns.        No follow-ups on file.    DISCLAIMER: This note may have been dictated using voice recognition software and may contain grammatical errors.

## 2025-07-28 ENCOUNTER — TELEPHONE (OUTPATIENT)
Dept: ORTHOPEDICS | Facility: CLINIC | Age: 70
End: 2025-07-28
Payer: MEDICARE

## 2025-07-28 NOTE — TELEPHONE ENCOUNTER
Pt called left  stating that she would like to schedule appt for gel injections for the LT knee.    Called pt back and informed her that since we have not seen her for the LT knee that she will have to come in and be evaluated in order to get this inj approved.    Spoke with pt, appt scheduled for 8/6/25 @ 1030 for LT knee eval.

## 2025-08-06 ENCOUNTER — OFFICE VISIT (OUTPATIENT)
Dept: ORTHOPEDICS | Facility: CLINIC | Age: 70
End: 2025-08-06
Payer: MEDICARE

## 2025-08-06 ENCOUNTER — HOSPITAL ENCOUNTER (OUTPATIENT)
Dept: RADIOLOGY | Facility: CLINIC | Age: 70
Discharge: HOME | End: 2025-08-06
Attending: PHYSICIAN ASSISTANT
Payer: MEDICARE

## 2025-08-06 VITALS
HEIGHT: 61 IN | WEIGHT: 121 LBS | SYSTOLIC BLOOD PRESSURE: 123 MMHG | DIASTOLIC BLOOD PRESSURE: 78 MMHG | BODY MASS INDEX: 22.84 KG/M2 | HEART RATE: 85 BPM

## 2025-08-06 DIAGNOSIS — M25.562 LEFT KNEE PAIN, UNSPECIFIED CHRONICITY: ICD-10-CM

## 2025-08-06 DIAGNOSIS — M17.12 PRIMARY OSTEOARTHRITIS OF LEFT KNEE: Primary | ICD-10-CM

## 2025-08-06 PROCEDURE — 20610 DRAIN/INJ JOINT/BURSA W/O US: CPT | Mod: LT,,, | Performed by: PHYSICIAN ASSISTANT

## 2025-08-06 PROCEDURE — 73564 X-RAY EXAM KNEE 4 OR MORE: CPT | Mod: LT,,, | Performed by: PHYSICIAN ASSISTANT

## 2025-08-06 PROCEDURE — 1160F RVW MEDS BY RX/DR IN RCRD: CPT | Mod: CPTII,,, | Performed by: PHYSICIAN ASSISTANT

## 2025-08-06 PROCEDURE — 1101F PT FALLS ASSESS-DOCD LE1/YR: CPT | Mod: CPTII,,, | Performed by: PHYSICIAN ASSISTANT

## 2025-08-06 PROCEDURE — 3288F FALL RISK ASSESSMENT DOCD: CPT | Mod: CPTII,,, | Performed by: PHYSICIAN ASSISTANT

## 2025-08-06 PROCEDURE — 1159F MED LIST DOCD IN RCRD: CPT | Mod: CPTII,,, | Performed by: PHYSICIAN ASSISTANT

## 2025-08-06 PROCEDURE — 99213 OFFICE O/P EST LOW 20 MIN: CPT | Mod: 25,,, | Performed by: PHYSICIAN ASSISTANT

## 2025-08-06 PROCEDURE — 3078F DIAST BP <80 MM HG: CPT | Mod: CPTII,,, | Performed by: PHYSICIAN ASSISTANT

## 2025-08-06 PROCEDURE — 3074F SYST BP LT 130 MM HG: CPT | Mod: CPTII,,, | Performed by: PHYSICIAN ASSISTANT

## 2025-08-06 PROCEDURE — 3008F BODY MASS INDEX DOCD: CPT | Mod: CPTII,,, | Performed by: PHYSICIAN ASSISTANT

## 2025-08-06 RX ORDER — DICLOFENAC SODIUM 75 MG/1
75 TABLET, DELAYED RELEASE ORAL 2 TIMES DAILY PRN
COMMUNITY
Start: 2025-07-23

## 2025-08-06 RX ORDER — BETAMETHASONE SODIUM PHOSPHATE AND BETAMETHASONE ACETATE 3; 3 MG/ML; MG/ML
12 INJECTION, SUSPENSION INTRA-ARTICULAR; INTRALESIONAL; INTRAMUSCULAR; SOFT TISSUE
Status: DISCONTINUED | OUTPATIENT
Start: 2025-08-06 | End: 2025-08-06 | Stop reason: HOSPADM

## 2025-08-06 RX ADMIN — BETAMETHASONE SODIUM PHOSPHATE AND BETAMETHASONE ACETATE 12 MG: 3; 3 INJECTION, SUSPENSION INTRA-ARTICULAR; INTRALESIONAL; INTRAMUSCULAR; SOFT TISSUE at 10:08

## 2025-08-06 NOTE — PROCEDURES
Large Joint Aspiration/Injection: L knee    Date/Time: 8/6/2025 10:30 AM    Performed by: Jae Arevalo PA  Authorized by: Jae Arevalo PA    Timeout: prior to procedure the correct patient, procedure, and site was verified    Prep: patient was prepped and draped in usual sterile fashion      Local anesthesia used?: Yes    Local anesthetic:  Lidocaine 2% without epinephrine    Details:  Needle Size:  25 G  Ultrasonic Guidance for needle placement?: No    Location:  Knee  Site:  L knee  Medications:  12 mg betamethasone acetate-betamethasone sodium phosphate 6 mg/mL  Patient tolerance:  Patient tolerated the procedure well with no immediate complications

## 2025-08-06 NOTE — PROGRESS NOTES
Chief Complaint:   Chief Complaint   Patient presents with    Knee Pain     L knee pain - started bothering her severely about 1 mo ago, states her pain is unbearable. Somtime takes tylenol and diclofenac with some relief. Trying to bear weight mostly on R knee due to pain, states that walking irritates her L knee. Describes it as a sharp aching pain with it being more intense in the evening. Does experience swelling at times. Wears a support brace and states that it helps.   Wants to discuss Synvisc        History of present illness:    69-year-old female presents office today for evaluation for left knee pain.  She has a known history of osteoarthritis.  Her pain started to worsened last month.  She has always had some intermittent medial-sided knee pain but has now gotten more severe.  She denies any history of injury.  She denies any locking or catching episodes.  She does have a history of rheumatoid arthritis and currently on Humira and methotrexate.  She takes intermittent Tylenol and diclofenac.  She has a history of right total knee arthroplasty done a couple of years ago and has done very well with that.  She is here to discuss treatment options regarding her left knee    Past Medical History:   Diagnosis Date    High cholesterol     Hypertension     RA (rheumatoid arthritis)        Past Surgical History:   Procedure Laterality Date    right ovary removal      ROBOTIC ARTHROPLASTY, KNEE Right 8/9/2022    Procedure: ROBOTIC ARTHROPLASTY, KNEE, TOTAL;  Surgeon: Abhinav Carroll MD;  Location: [facility];  Service: Orthopedics;  Laterality: Right;       Current Outpatient Medications   Medication Sig    adalimumab (HUMIRA) 10 mg/0.2 mL SyKt Inject 0.2 mLs (10 mg total) into the skin every 14 (fourteen) days. HOLD FOR 2-3 WEEKS POST-OP    amLODIPine (NORVASC) 10 MG tablet Take 10 mg by mouth once daily.    cetirizine (ZYRTEC) 10 MG tablet Zyrtec 10 mg tablet   Take 1 tablet every day by oral route. (Patient taking  "differently: PRN)    diclofenac (VOLTAREN) 75 MG EC tablet Take 75 mg by mouth 2 (two) times daily as needed.    folic acid (FOLVITE) 1 MG tablet Take 1,000 mcg by mouth once daily.    methotrexate 2.5 MG Tab Take 6 tablets (15 mg total) by mouth every 7 days. HOLD FOR 2-3 WEEKS POST-OP    multivitamin capsule Take 1 capsule by mouth once daily.    rosuvastatin (CRESTOR) 5 MG tablet Take 5 mg by mouth once daily.    ascorbic acid, vitamin C, (VITAMIN C) 1000 MG tablet Take 1,000 mg by mouth once daily. (Patient not taking: Reported on 8/6/2025)    aspirin (ECOTRIN) 81 MG EC tablet Take 1 tablet (81 mg total) by mouth every 12 (twelve) hours.    benazepriL (LOTENSIN) 40 MG tablet Take 40 mg by mouth once daily. (Patient not taking: Reported on 8/21/2024)     No current facility-administered medications for this visit.       Review of patient's allergies indicates:   Allergen Reactions    Opioids - morphine analogues Hallucinations    Codeine Nausea And Vomiting    Lortab [hydrocodone-acetaminophen] Nausea And Vomiting       Family History   Family history unknown: Yes       Social History[1]      Review of Systems:    Constitution:   Denies chills, fever, and sweats.  HENT:   Denies headaches or blurry vision.  Cardiovascular:  Denies chest pain or irregular heart beat.  Respiratory:   Denies cough or shortness of breath.  Gastrointestinal:  Denies abdominal pain, nausea, or vomiting.  Musculoskeletal:   Denies muscle cramps.  Neurological:   Denies dizziness or focal weakness.  Psychiatric/Behavior: Normal mental status.  Hematology/Lymph:  Denies bleeding problem or easy bruising/bleeding.  Skin:    Denies rash or suspicious lesions.    Examination:    Vital Signs:    Vitals:    08/06/25 1023   BP: 123/78   Pulse: 85   Weight: 54.9 kg (121 lb)   Height: 5' 1" (1.549 m)       Body mass index is 22.86 kg/m².    Constitution:   Well-developed, well nourished patient in no acute distress.  Neurological:   Alert and " oriented x 3 and cooperative to examination.     Psychiatric/Behavior: Normal mental status.  Respiratory:   No shortness of breath.  Nonlabored breathing  Cardiovascular:           Regular rate and rhythm  Eyes:    Extraoccular muscles intact  Skin:    No scars, rash or suspicious lesions.    Physical Exam:     Left Knee:     Grade effusion 1    No erythema or increased heat   varus deformity    Patella demonstrated crepitus.    Anteromedial aspect was tender on palpation. Medial aspect was tender on palpation. Medial collateral ligament was tender on palpation.    No lateral joint line tenderness   Active flexion 120 degrees   Active extension 5 degrees   antalgic gait was observed.     X-Ray Knee: A complete knee x-ray with standing for 4 views was performed of the left knee     IMPRESSIONS RADIOLOGY TEST   Narrowing of the joint space in medial and patellofemoral compartments, and osteophytes arising from the knee.    Kellgren Nestor grade 3 changes        Assessment:     Primary osteoarthritis of left knee  -     X-Ray Knee Complete 4 or More Views Left; Future; Expected date: 08/06/2025  -     Large Joint Aspiration/Injection: L knee  -     Prior authorization Order        Plan:      I have discussed exam and x-ray findings with the patient today.  She has moderate to advanced osteoarthritis of the left knee.  Her symptoms have recently worsened over the last month.  I recommend corticosteroid injection today.  Patient tolerated procedure well.  She is not quite ready for surgical intervention.  She is inquiring about Synvisc.  We discussed getting authorization to try Synvisc-One to the left knee as another form of pain relief.  We will get authorization and bring her in next month for left knee Synvisc-One injection        Follow up in about 4 weeks (around 9/3/2025).    DISCLAIMER: This note may have been dictated using voice recognition software and may contain grammatical errors.          [1]   Social  History  Socioeconomic History    Marital status:    Tobacco Use    Smoking status: Never    Smokeless tobacco: Never   Substance and Sexual Activity    Alcohol use: Never    Drug use: Never    Sexual activity: Not Currently

## (undated) DEVICE — PIN BONE 3.2X110MM
Type: IMPLANTABLE DEVICE | Site: KNEE | Status: NON-FUNCTIONAL
Removed: 2022-08-09

## (undated) DEVICE — ELECTRODE PATIENT RETURN DISP

## (undated) DEVICE — GLOVE PROTEXIS LTX MICRO 8

## (undated) DEVICE — KIT VIZADISC KNEE TRACKING

## (undated) DEVICE — GOWN POLY REINF X-LONG 2XL

## (undated) DEVICE — CUFF ATS 2 PORT SNGL BLDR 34IN

## (undated) DEVICE — PAD CAST SPECIALIST STRL 6

## (undated) DEVICE — GAUZE SPONGE 4'X4 12 PLY

## (undated) DEVICE — DRAPE STERI U-SHAPED 47X51IN

## (undated) DEVICE — GLOVE PROTEXIS HYDROGEL SZ8

## (undated) DEVICE — TAPE SILK 3IN

## (undated) DEVICE — COVER TABLE HVY DTY 60X90IN

## (undated) DEVICE — PAD ABD 8X10 STERILE

## (undated) DEVICE — BLADE SAG DUAL CUT 25X90MM

## (undated) DEVICE — DRESSING N ADH OIL EMUL 3X8 3S

## (undated) DEVICE — HOOD FLYTE SURGICOOL

## (undated) DEVICE — KIT DRAPE RIO ONE PIECE W/POCK

## (undated) DEVICE — GAUZE SPONGE 4X4 12PLY

## (undated) DEVICE — DRAPE FULL SHEET 70X100IN

## (undated) DEVICE — SUT VICRYL 2-0 36 CT-1

## (undated) DEVICE — DRESSING ADAPTIC N ADH 3X8IN

## (undated) DEVICE — BANDAGE ACE ELASTIC 6"

## (undated) DEVICE — SOL NACL IRR 3000ML

## (undated) DEVICE — KIT SURGICAL TURNOVER

## (undated) DEVICE — DRAPE MEDIUM SHEET 40X70IN

## (undated) DEVICE — KIT CHECKPOINT TIBIAL

## (undated) DEVICE — WRAP DEMAYO LEG STERILE

## (undated) DEVICE — APPLICATOR CHLORAPREP ORN 26ML

## (undated) DEVICE — Device

## (undated) DEVICE — SUT ETHIBOND XTRA 1 CTX

## (undated) DEVICE — CORD SILICONE RETRACTOR

## (undated) DEVICE — GLOVE PROTEXIS BLUE LATEX 8

## (undated) DEVICE — CUSHION  WC FOAM 20X20X.75IN

## (undated) DEVICE — BLADE MAKO STANDARD

## (undated) DEVICE — SUT VICRYL 1 OB 36 CTX